# Patient Record
Sex: FEMALE | Race: BLACK OR AFRICAN AMERICAN | NOT HISPANIC OR LATINO | Employment: FULL TIME | URBAN - METROPOLITAN AREA
[De-identification: names, ages, dates, MRNs, and addresses within clinical notes are randomized per-mention and may not be internally consistent; named-entity substitution may affect disease eponyms.]

---

## 2018-10-25 ENCOUNTER — OFFICE VISIT (OUTPATIENT)
Dept: INTERNAL MEDICINE | Facility: CLINIC | Age: 28
End: 2018-10-25
Payer: COMMERCIAL

## 2018-10-25 VITALS
HEIGHT: 64 IN | DIASTOLIC BLOOD PRESSURE: 84 MMHG | SYSTOLIC BLOOD PRESSURE: 120 MMHG | HEART RATE: 71 BPM | OXYGEN SATURATION: 99 % | WEIGHT: 186.5 LBS | TEMPERATURE: 99 F | BODY MASS INDEX: 31.84 KG/M2

## 2018-10-25 DIAGNOSIS — S39.012A BACK STRAIN, INITIAL ENCOUNTER: ICD-10-CM

## 2018-10-25 DIAGNOSIS — Z76.89 ENCOUNTER TO ESTABLISH CARE: Primary | ICD-10-CM

## 2018-10-25 DIAGNOSIS — N62 LARGE BREASTS: ICD-10-CM

## 2018-10-25 PROCEDURE — 99999 PR PBB SHADOW E&M-NEW PATIENT-LVL IV: CPT | Mod: PBBFAC,,, | Performed by: INTERNAL MEDICINE

## 2018-10-25 PROCEDURE — 3008F BODY MASS INDEX DOCD: CPT | Mod: CPTII,S$GLB,, | Performed by: INTERNAL MEDICINE

## 2018-10-25 PROCEDURE — 99203 OFFICE O/P NEW LOW 30 MIN: CPT | Mod: S$GLB,,, | Performed by: INTERNAL MEDICINE

## 2018-10-25 RX ORDER — CYCLOBENZAPRINE HCL 5 MG
5 TABLET ORAL 3 TIMES DAILY PRN
Qty: 30 TABLET | Refills: 1 | Status: SHIPPED | OUTPATIENT
Start: 2018-10-25 | End: 2018-11-04

## 2018-10-25 RX ORDER — NAPROXEN 500 MG/1
500 TABLET ORAL 2 TIMES DAILY PRN
Qty: 30 TABLET | Refills: 1 | Status: SHIPPED | OUTPATIENT
Start: 2018-10-25 | End: 2019-02-13

## 2018-10-25 NOTE — PROGRESS NOTES
Subjective:       Patient ID: Germaine Armando is a 28 y.o. female.    Chief Complaint: [facility]    HPI Mrs. Armando is a 28-year-old female who presents to [facility] and with a chief complaint of back pain.  Patient states that her back hurts a lot.  She is thinking it is possibly due to her breast size.  In the morning when she is lying on her stomach she has a bend in her back.  Her back pain is constant and occurs throughout the day.  It is located in the lower back.  When asked to describe the character of the pain, the patient reports it just hurts.  She rates her pain as 6.5/10 in severity.  She has used ibuprofen and this has helped some for her pain.  Her pain has been present for a few months.  Lifting makes her pain worse.  Nothing, other than ibuprofen, makes the pain better.    Patient is interested in breast reduction surgery.  She would like to see the plastic surgeon that her cousin saw. She cannot recall his name at this time.    Health maintenance:  The patient follows with the health unit.  She plans to return there in 1 month for repeat Pap smear.  She works for [employer] received her flu shot at the [employer] flu fair.  She recently saw Dr. Victor who perform many labs on her.  She is unsure of the lab results.  She was diagnosed with hypertension at that visit, but states that she was in pain on that day.    Review of Systems   Musculoskeletal: Positive for back pain.   All other systems reviewed and are negative.      Objective:      Physical Exam   Constitutional: She is oriented to person, place, and time. She appears well-developed and well-nourished. No distress.   HENT:   Head: Normocephalic and atraumatic.   Eyes: Conjunctivae and EOM are normal.   Cardiovascular: Normal rate, regular rhythm, normal heart sounds and intact distal pulses. Exam reveals no gallop and no friction rub.   No murmur heard.  Pulmonary/Chest: Effort normal and breath sounds normal. No stridor. No  respiratory distress. She has no wheezes. She has no rales.   Musculoskeletal:        Arms:  Area pain as highlighted in the diagram.  No visible erythema, edema, ecchymosis, or deformity noted. Slight worsening of pain with deep palpation.  Patient has full range of motion passively with bilateral hips and knees.  Patient is able to perform flexion of the waist, with minimal exacerbation of pain in the lower back.   Neurological: She is alert and oriented to person, place, and time.   Skin: Skin is warm and dry. She is not diaphoretic.   Psychiatric: She has a normal mood and affect. Her behavior is normal. Judgment and thought content normal.   Vitals reviewed.      Assessment:       1. Encounter to establish care    2. Back strain, initial encounter    3. Large breasts        Plan:     1.  Encounter to establish care  Patient recently had blood work performed at outside facility.  Attempting to obtain those records today.    2.  Back strain  Discussed with patient the importance of movement, massage to the site, and application of heat to the site.  Naproxen 500 mg p.o. b.i.d. p.r.n. pain  Flexeril 5 mg p.o. t.i.d. p.r.n. Pain  Instructions given in AVS (see AVS).  If no relief after 2 weeks of the above, will refer to physical therapy.    3. Large breasts  Plastic surgery referral placed    RTC PRN    3. Large breasts

## 2018-10-25 NOTE — PATIENT INSTRUCTIONS
Back Pain (Acute or Chronic)    Back pain is one of the most common problems. The good news is that most people feel better in 1 to 2 weeks, and most of the rest in 1 to 2 months. Most people can remain active.  People experience and describe pain differently; not everyone is the same.  · The pain can be sharp, stabbing, shooting, aching, cramping or burning.  · Movement, standing, bending, lifting, sitting, or walking may worsen pain.  · It can be localized to one spot or area, or it can be more generalized.  · It can spread or radiate upwards, to the front, or go down your arms or legs (sciatica).  · It can cause muscle spasm.  Most of the time, mechanical problems with the muscles or spine cause the pain. Mechanical problems are usually caused by an injury to the muscles or ligaments. While illness can cause back pain, it is usually not caused by a serious illness. Mechanical problems include:   · Physical activity such as sports, exercise, work, or normal activity  · Overexertion, lifting, pushing, pulling incorrectly or too aggressively  · Sudden twisting, bending, or stretching from an accident, or accidental movement  · Poor posture  · Stretching or moving wrong, without noticing pain at the time  · Poor coordination, lack of regular exercise (check with your doctor about this)  · Spinal disc disease or arthritis  · Stress  Pain can also be related to pregnancy, or illness like appendicitis, bladder or kidney infections, pelvic infections, and many other things.  Acute back pain usually gets better in 1 to 2 weeks. Back pain related to disk disease, arthritis in the spinal joints or spinal stenosis (narrowing of the spinal canal) can become chronic and last for months or years.  Unless you had a physical injury (for example, a car accident or fall) X-rays are usually not needed for the initial evaluation of back pain. If pain continues and does not respond to medical treatment, X-rays and other tests may be  needed.  Home care  Try these home care recommendations:  · When in bed, try to find a position of comfort. A firm mattress is best. Try lying flat on your back with pillows under your knees. You can also try lying on your side with your knees bent up towards your chest and a pillow between your knees.  · At first, do not try to stretch out the sore spots. If there is a strain, it is not like the good soreness you get after exercising without an injury. In this case, stretching may make it worse.  · Avoid prolong sitting, long car rides, or travel. This puts more stress on the lower back than standing or walking.  · During the first 24 to 72 hours after an acute injury or flare up of chronic back pain, apply an ice pack to the painful area for 20 minutes and then remove it for 20 minutes. Do this over a period of 60 to 90 minutes or several times a day. This will reduce swelling and pain. Wrap the ice pack in a thin towel or plastic to protect your skin.  · You can start with ice, then switch to heat. Heat (hot shower, hot bath, or heating pad) reduces pain and works well for muscle spasms. Heat can be applied to the painful area for 20 minutes then remove it for 20 minutes. Do this over a period of 60 to 90 minutes or several times a day. Do not sleep on a heating pad. It can lead to skin burns or tissue damage.  · You can alternate ice and heat therapy. Talk with your doctor about the best treatment for your back pain.  · Therapeutic massage can help relax the back muscles without stretching them.  · Be aware of safe lifting methods and do not lift anything without stretching first.  Medicines  Talk to your doctor before using medicine, especially if you have other medical problems or are taking other medicines.  · You may use over-the-counter medicine as directed on the bottle to control pain, unless another pain medicine was prescribed. If you have chronic conditions like diabetes, liver or kidney disease,  stomach ulcers, or gastrointestinal bleeding, or are taking blood thinners, talk to your doctor before taking any medicine.  · Be careful if you are given a prescription medicines, narcotics, or medicine for muscle spasms. They can cause drowsiness, affect your coordination, reflexes, and judgement. Do not drive or operate heavy machinery.  Follow-up care  Follow up with your healthcare provider, or as advised.   A radiologist will review any X-rays that were taken. Your provide will notify you of any new findings that may affect your care.  Call 911  Call emergency services if any of the following occur:  · Trouble breathing  · Confusion  · Very drowsy or trouble awakening  · Fainting or loss of consciousness  · Rapid or very slow heart rate  · Loss of bowel or bladder control  When to seek medical advice  Call your healthcare provider right away if any of these occur:   · Pain becomes worse or spreads to your legs  · Weakness or numbness in one or both legs  · Numbness in the groin or genital area  Date Last Reviewed: 7/1/2016 © 2000-2017 [employer]. [address] [place] [address]. All rights reserved. This information is not intended as a substitute for professional medical care. Always follow your healthcare professional's instructions.        Back Sprain or Strain    Injury to the muscles (strain) or ligaments (sprain) around the spine can be troubling. Injury may occur after a sudden forceful twisting or bending force such as in a car accident, after a simple awkward movement, or after lifting something heavy with poor body positioning. In any case, muscle spasm is often present and adds to the pain.  Thankfully, most people feel better in 1 to 2 weeks, and most of the rest in 1 to 2 months. Most people can remain active. Unless you had a forceful or traumatic physical injury such as a car accident or fall, X-rays may not be ordered for the first evaluation of a back sprain or  strain. If pain continues and does not respond to medical treatment, your healthcare provider may then order X-rays and other tests.  Home care  The following guidelines will help you care for your injury at home:  · When in bed, try to find a comfortable position. A firm mattress is best. Try lying flat on your back with pillows under your knees. You can also try lying on your side with your knees bent up toward your chest and a pillow between your knees.  · Don't sit for long periods. Try not to take long car rides or take other trips that have you sitting for a long time. This puts more stress on the lower back than standing or walking.  · During the first 24 to 72 hours after an injury or flare-up, apply an ice pack to the painful area for 20 minutes. Then remove it for 20 minutes. Do this for 60 to 90 minutes, or several times a day. This will reduce swelling and pain. Be sure to wrap the ice pack in a thin towel or plastic to protect your skin.  · You can start with ice, then switch to heat. Heat from a hot shower, hot bath, or heating pad reduces pain and works well for muscle spasms. Put heat on the painful area for 20 minutes, then remove for 20 minutes. Do this for 60 to 90 minutes, or several times a day. Do not use a heating pad while sleeping. It can burn the skin.  · You can alternate the ice and heat. Talk with your healthcare provider to find out the best treatment or therapy for your back pain.  · Therapeutic massage will help relax the back muscles without stretching them.  · Be aware of safe lifting methods. Do not lift anything over 15 pounds until all of the pain is gone.  Medicines  Talk to your healthcare provider before using medicines, especially if you have other health problems or are taking other medicines.  · You may use acetaminophen or ibuprofen to control pain, unless another pain medicine was prescribed. If you have chronic conditions like diabetes, liver or kidney disease, stomach  ulcers, or gastrointestinal bleeding, or are taking blood-thinner medicines, talk with your doctor before taking any medicines.  · Be careful if you are given prescription medicines, narcotics, or medicine for muscle spasm. They can cause drowsiness, and affect your coordination, reflexes, and judgment. Do not drive or operate heavy machinery when taking these types of medicines. Only take pain medicine as prescribed by your healthcare provider.  Follow-up care  Follow up with your healthcare provider, or as advised. You may need physical therapy or more tests if your symptoms get worse.  If you had X-rays your healthcare provider may be checking for any broken bones, breaks, or fractures. Bruises and sprains can sometimes hurt as much as a fracture. These injuries can take time to heal completely. If your symptoms dont improve or they get worse, talk with your healthcare provider. You may need a repeat X-ray or other tests.  Call 911  Call for emergency care if any of the following occur:  · Trouble breathing  · Confused  · Very drowsy or trouble awakening  · Fainting or loss of consciousness  · Rapid or very slow heart rate  · Loss of bowel or bladder control  When to seek medical advice  Call your healthcare provider right away if any of the following occur:  · Pain gets worse or spreads to your arms or legs  · Weakness or numbness in one or both arms or legs  · Numbness in the groin or genital area  Date Last Reviewed: 6/1/2016  © 9439-7414 [employer]. [address] [place] [address]. All rights reserved. This information is not intended as a substitute for professional medical care. Always follow your healthcare professional's instructions.    Continue movement, heat application and massage. You can use OTC icy hot for relief as well.

## 2018-11-15 ENCOUNTER — OFFICE VISIT (OUTPATIENT)
Dept: PLASTIC SURGERY | Facility: CLINIC | Age: 28
End: 2018-11-15
Payer: COMMERCIAL

## 2018-11-15 VITALS
HEART RATE: 72 BPM | TEMPERATURE: 99 F | RESPIRATION RATE: 16 BRPM | DIASTOLIC BLOOD PRESSURE: 96 MMHG | BODY MASS INDEX: 31.62 KG/M2 | WEIGHT: 185.19 LBS | HEIGHT: 64 IN | SYSTOLIC BLOOD PRESSURE: 156 MMHG

## 2018-11-15 DIAGNOSIS — N62 MACROMASTIA: Primary | ICD-10-CM

## 2018-11-15 DIAGNOSIS — M54.2 CHRONIC NECK PAIN: ICD-10-CM

## 2018-11-15 DIAGNOSIS — M54.9 CHRONIC MIDLINE BACK PAIN, UNSPECIFIED BACK LOCATION: ICD-10-CM

## 2018-11-15 DIAGNOSIS — G89.29 CHRONIC NECK PAIN: ICD-10-CM

## 2018-11-15 DIAGNOSIS — G89.29 CHRONIC MIDLINE BACK PAIN, UNSPECIFIED BACK LOCATION: ICD-10-CM

## 2018-11-15 PROCEDURE — 99999 PR PBB SHADOW E&M-EST. PATIENT-LVL III: CPT | Mod: PBBFAC,,, | Performed by: SURGERY

## 2018-11-15 PROCEDURE — 3008F BODY MASS INDEX DOCD: CPT | Mod: CPTII,S$GLB,, | Performed by: SURGERY

## 2018-11-15 PROCEDURE — 99202 OFFICE O/P NEW SF 15 MIN: CPT | Mod: S$GLB,,, | Performed by: SURGERY

## 2018-11-15 RX ORDER — MEDROXYPROGESTERONE ACETATE 150 MG/ML
150 INJECTION, SUSPENSION INTRAMUSCULAR
COMMUNITY
Start: 2018-09-02 | End: 2020-08-18

## 2018-11-15 NOTE — PROGRESS NOTES
History & Physical  Plastic and Reconstructive Surgery  Breast Reduction Consult    SUBJECTIVE:   Chief complaint: large breasts    History of Present Illness:  Patient is a 28 y.o. female presents with symptomatic bilateral large pendulous  breasts. States that she has back and neck pain for greater than 5-6 years. Takes muscle relaxant, ibuprofen, and tylenol for this without relief. Complaints of shoulder grooving from blas straps. She currently wears a 38 DD. She has not used speciality interventions at this time. She has missed work before due to the pain. 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 NKDA.    PMH:  Med/Surg/Accidents:    C section                                                  CV: no congenital abn                                                    Pulm: no asthma, no chronic diseases                                                     FH:  Bleeding disorders:                         none         MH/anesthetic problems:                 none                  Sickle Cell:                                      none         Allergy/Asthma:                              none    SocialHx: denies etho and tobacco, works full time. Mother of two children.                                History reviewed. No pertinent past medical history.    Past Surgical History:   Procedure Laterality Date     SECTION         Family History   Problem Relation Age of Onset    Heart disease Maternal Grandfather        Social History     Socioeconomic History    Marital status: Single     Spouse name: None    Number of children: None    Years of education: None    Highest education level: None   Social Needs    Financial resource strain: None    Food insecurity - worry: None    Food insecurity - inability: None    Transportation needs - medical: None    Transportation needs - non-medical: None   Occupational History    None   Tobacco Use    Smoking status: Never Smoker    Smokeless tobacco:  "Never Used   Substance and Sexual Activity    Alcohol use: Yes     Alcohol/week: 1.8 oz     Types: 3 Shots of liquor per week     Frequency: Monthly or less     Drinks per session: 3 or 4     Binge frequency: Never     Comment: 3 in one month    Drug use: No    Sexual activity: Not Currently     Partners: Male   Other Topics Concern    None   Social History Narrative    None       Current Outpatient Medications   Medication Sig Dispense Refill    medroxyPROGESTERone (DEPO-PROVERA) 150 mg/mL injection Inject 150 mg into the muscle every 3 (three) months.      naproxen (NAPROSYN) 500 MG tablet Take 1 tablet (500 mg total) by mouth 2 (two) times daily as needed (back pain). 30 tablet 1     No current facility-administered medications for this visit.        Review of patient's allergies indicates:  No Known Allergies      Review of Systems:    Review of Systems   HENT: Positive for neck pain.    Musculoskeletal: Positive for back pain.   Neurological: Positive for headaches and neck pain    Pertinent 12 point ROS negative except as listed above. She denies history of fatigue, weight change, eye problems, ear problems, hoarseness/voice change, chest discomfort, palpitations, vein inflammation, swollen feet, breathing problems, chronic cough, indigestion, trouble swallowing, abdominal pain, blood in stool, urinary problems, prostate problems, sexual problems, joint problems, back pain, musculoskeletal pain, skin problems, dizziness, headaches, muscle weakness, trouble sleeping, abnormal bruising, abnormal thirst, abnormal sweating, depression, anxiety, or any prior psychiatry consultation.        OBJECTIVE:     BP (!) 156/96   Pulse 72   Temp 98.8 °F (37.1 °C) (Oral)   Resp 16   Ht 5' 4" (1.626 m)   Wt 84 kg (185 lb 3 oz)   BMI 31.79 kg/m²     Physical Exam:    Physical Exam   Constitutional: She is oriented to person, place, and time. She appears well-developed and well-nourished.   Neck: Normal range of " motion. Neck supple. No tracheal deviation present.   Cardiovascular: Normal rate, regular rhythm and normal heart sounds.    Pulmonary/Chest: Effort normal and breath sounds normal. bilaterally enlarged breasts, evidence of previous rashes, shoulder grooving, no palpable masses, nipple everted  Abdominal: Soft. Bowel sounds are normal.   Musculoskeletal: Normal range of motion.   Neurological: She is alert and oriented to person, place, and time.   Skin: Skin is warm.        Right Left   Dome     SN-N 30.5 31   N-IMF 12 13   N-Midline     Areola 4x4 4x4   Base Width 16 16       ASSESSMENT/PLAN:     1.Symptomatic Bilateral Macromastia  2. Chronic neck pain  3. Chronic back pain    PLAN:    -Will need 550 gm reduction per side  -Will submit paper work for insurance approval  -Photos obtained  -Risk, benefits, and alternatives explained.    Dr. Beebe discussed the bilateral breast reduction procedure was in detail with the patient as were the risks and possible complications which include but are not limited to bleeding, scarring, infection, pain, numbness, asymmetry, deformity, large open wound, skin necrosis, wound dehiscence, permanent or temporary loss of sensation to the nipple(s), partial or total loss of the nipple(s), free nipple graft, death, brain damage, pulmonary embolus and need for further surgery.     She is otherwise healthy.  I approximate that she has 1800 grams in her left breast and 1500 grams in the right.  She has grade 2 ptosis.  Her left NAC is lower than her right.  She does not have dense breasts.  She has nipple rings which she says can be removed.  I took the breast measurements. I explained the location of incision, nature of the parenchymal reduction portion of the procedure. I would offer a wise pattern, inferior pedicle reduction with the above volume.  She understands that I cannot guarantee a cup size and that I may need to perform a free nipple graft.  She could have loss of areola  sensation.  I could place drains if I am concerned.  It is generally an outpatient procedure.  She could stay in house over night.  All of her questions were answered.  If she is approved she will return for a preoperative appointment.      Plastic & Reconstructive Surgery  Microsurgery  [facility]  c/o Bill Beebe M.D.  [facility]  [address]  [address] [place] [address]    Work [phone]  Toll free [phone]

## 2018-11-15 NOTE — LETTER
November 15, 2018      Marie Grider MD  [address] [place] [address]           [facility]  [address]  [facility] [address]  Phone: [phone]  Fax: [phone]          Patient: Germaine Armando   MR Number: [id number]   YOB: 1990   Date of Visit: 11/15/2018       Dear Dr. Marie Grider:    Thank you for referring Germaine Armando to me for evaluation. Attached you will find relevant portions of my assessment and plan of care.    If you have questions, please do not hesitate to call me. I look forward to following Germaine Armando along with you.    Sincerely,    Bill Beebe MD    Enclosure  CC:  No Recipients    If you would like to receive this communication electronically, please contact [email or web address] or [phone] to request more information on [facility] access.    For providers and/or their staff who would like to refer a patient to [facility], please contact us through our one-stop-shop provider referral line, [facility], at [phone].    If you feel you have received this communication in error or would no longer like to receive these types of communications, please e-mail [email or web address]

## 2018-11-23 ENCOUNTER — TELEPHONE (OUTPATIENT)
Dept: INTERNAL MEDICINE | Facility: CLINIC | Age: 28
End: 2018-11-23

## 2018-11-23 NOTE — TELEPHONE ENCOUNTER
Received records from Dr. Victor's office.  Patient had lab work performed on October 4, 2018.  Lipid profile showed total cholesterol of 138, triglycerides 80, HDL 27, LDL 95.  CMP and CBC also obtained on same-day. Glucose 82, BUN 12, creatinine 0.86, sodium 138, potassium 3.7, chloride 103, CO2 27, anion gap 8, calcium 9.5, alkaline phosphatase 65, AST 17, total protein 6.8, albumin 4.1, ALT 15, total bilirubin 0.7, EGFR greater than 60.  CBC was within normal limits with WBC 9.2, hemoglobin 12.0, hematocrit 38.6, MCV 90, platelets 250.  These lab results will be scanned into the medical record.

## 2018-11-26 ENCOUNTER — PATIENT MESSAGE (OUTPATIENT)
Dept: PLASTIC SURGERY | Facility: CLINIC | Age: 28
End: 2018-11-26

## 2018-11-29 ENCOUNTER — TELEPHONE (OUTPATIENT)
Dept: PLASTIC SURGERY | Facility: CLINIC | Age: 28
End: 2018-11-29

## 2018-11-29 NOTE — TELEPHONE ENCOUNTER
Spoke with pt regarding her concerns about her possible procedure. Pt had insurance questions, I gave pt telephone number to insurance dept.        ----- Message from Thalia Mazariegos sent at 11/29/2018 12:50 PM CST -----  Contact: Self   Pt is requesting a call back in regards to an update for her possible procedure.       Pt can be contacted at [phone]

## 2019-01-15 ENCOUNTER — TELEPHONE (OUTPATIENT)
Dept: PLASTIC SURGERY | Facility: CLINIC | Age: 29
End: 2019-01-15

## 2019-01-15 NOTE — TELEPHONE ENCOUNTER
Spoke to pt regarding her wanting to know the status of her BBR, Pt  asked me for Sophy jen in authorizations. I gave pt the number , pt was satisfied.           ----- Message from Sue Cuevas sent at 1/15/2019  3:27 PM CST -----  Contact: self   Pt is calling in regards to checking on the status of her breast reduction.    She can be reached at [phone].    Thank you     
Adequate: hears normal conversation without difficulty

## 2019-01-22 ENCOUNTER — HOSPITAL ENCOUNTER (EMERGENCY)
Facility: HOSPITAL | Age: 29
Discharge: HOME | End: 2019-01-22
Attending: FAMILY MEDICINE
Payer: COMMERCIAL

## 2019-01-22 VITALS
RESPIRATION RATE: 18 BRPM | SYSTOLIC BLOOD PRESSURE: 146 MMHG | TEMPERATURE: 99 F | HEART RATE: 72 BPM | DIASTOLIC BLOOD PRESSURE: 87 MMHG | BODY MASS INDEX: 30.73 KG/M2 | HEIGHT: 64 IN | OXYGEN SATURATION: 100 % | WEIGHT: 180 LBS

## 2019-01-22 DIAGNOSIS — M54.9 CHRONIC NECK AND BACK PAIN: ICD-10-CM

## 2019-01-22 DIAGNOSIS — G89.29 CHRONIC NECK AND BACK PAIN: ICD-10-CM

## 2019-01-22 DIAGNOSIS — M54.2 CHRONIC NECK AND BACK PAIN: ICD-10-CM

## 2019-01-22 DIAGNOSIS — N62 MACROMASTIA: Primary | ICD-10-CM

## 2019-01-22 DIAGNOSIS — L23.9 ALLERGIC DERMATITIS: Primary | ICD-10-CM

## 2019-01-22 PROCEDURE — 63600175 PHARM REV CODE 636 W HCPCS: Mod: ER | Performed by: PHYSICIAN ASSISTANT

## 2019-01-22 PROCEDURE — 99283 EMERGENCY DEPT VISIT LOW MDM: CPT | Mod: ER

## 2019-01-22 PROCEDURE — 25000003 PHARM REV CODE 250: Mod: ER | Performed by: PHYSICIAN ASSISTANT

## 2019-01-22 RX ORDER — DIPHENHYDRAMINE HCL 25 MG
25 CAPSULE ORAL
Status: COMPLETED | OUTPATIENT
Start: 2019-01-22 | End: 2019-01-22

## 2019-01-22 RX ORDER — PREDNISONE 20 MG/1
20 TABLET ORAL DAILY
Qty: 4 TABLET | Refills: 0 | Status: SHIPPED | OUTPATIENT
Start: 2019-01-23 | End: 2019-01-28

## 2019-01-22 RX ORDER — TRIAMCINOLONE ACETONIDE 0.25 MG/G
CREAM TOPICAL 2 TIMES DAILY
Qty: 30 G | Refills: 0 | Status: SHIPPED | OUTPATIENT
Start: 2019-01-22 | End: 2019-02-13

## 2019-01-22 RX ORDER — PREDNISONE 20 MG/1
60 TABLET ORAL
Status: COMPLETED | OUTPATIENT
Start: 2019-01-22 | End: 2019-01-22

## 2019-01-22 RX ADMIN — DIPHENHYDRAMINE HYDROCHLORIDE 25 MG: 25 CAPSULE ORAL at 09:01

## 2019-01-22 RX ADMIN — PREDNISONE 60 MG: 20 TABLET ORAL at 09:01

## 2019-01-22 NOTE — ED PROVIDER NOTES
Encounter Date: 2019       History     Chief Complaint   Patient presents with    Rash     Pt states started with rash and itching while at work approx 3-4 hours PTA.  Pt noted with raised areas to chest, pt c/o itching and discomfort.  Reports no known sensitivities.  Pt denies SOB.      Patient is a 28-year-old female with no chronic medical conditions presenting with complaint of pruritic rash on the neck and bilateral wrists that began while she was at work today.  No exacerbating or relieving factors.  No systemic symptoms.  No new topicals, detergents, medications or foods.  No shortness of breath or chest pain.  No treatment prior to arrival.          Review of patient's allergies indicates:  No Known Allergies  History reviewed. No pertinent past medical history.  Past Surgical History:   Procedure Laterality Date     SECTION       Family History   Problem Relation Age of Onset    Heart disease Maternal Grandfather      Social History     Tobacco Use    Smoking status: Never Smoker    Smokeless tobacco: Never Used   Substance Use Topics    Alcohol use: Yes     Alcohol/week: 1.8 oz     Types: 3 Shots of liquor per week     Frequency: Monthly or less     Drinks per session: 3 or 4     Binge frequency: Never     Comment: 3 in one month    Drug use: No     Review of Systems   Constitutional: Negative for activity change, appetite change, chills, fatigue and fever.   HENT: Negative for congestion, ear pain, facial swelling, sore throat, trouble swallowing and voice change.    Respiratory: Negative for cough, shortness of breath and wheezing.    Cardiovascular: Negative for chest pain, palpitations and leg swelling.   Musculoskeletal: Negative for joint swelling, neck pain and neck stiffness.   Skin: Positive for pallor.        + pruritis   Neurological: Negative for dizziness and headaches.   All other systems reviewed and are negative.      Physical Exam     Initial Vitals [19 0857]    BP Pulse Resp Temp SpO2   (!) 146/87 72 18 99 °F (37.2 °C) 100 %      MAP       --         Physical Exam    Nursing note and vitals reviewed.  Constitutional: She appears well-developed and well-nourished. She appears distressed (mild. itching).   HENT:   Head: Normocephalic and atraumatic.   Nose: Nose normal.   Mouth/Throat: Oropharynx is clear and moist.   No swelling or stridor   Eyes: Conjunctivae and EOM are normal.   Neck: Normal range of motion. Neck supple.   Cardiovascular: Normal rate, regular rhythm, normal heart sounds and intact distal pulses.   Pulmonary/Chest: Breath sounds normal. No respiratory distress.   Lymphadenopathy:     She has no cervical adenopathy.   Skin: Skin is warm and dry.   Erythematous papular rash on the anterior neck, upper chest and bilateral posterior wrists. Excoriations present. No pustules of vesicles.    Psychiatric: She has a normal mood and affect. Her behavior is normal. Judgment and thought content normal.         ED Course   Procedures  Labs Reviewed - No data to display       Imaging Results    None          Medical Decision Making:   Source unknown, bur appears to be more of a contact dermatitis given the distribution. Treated with po prednisone and given rx for the same and triamcinolone cream. Follow up with PCP. Return to the ED for shortness of breath or worse in any way.                       Clinical Impression:   The encounter diagnosis was Allergic dermatitis.      Disposition:   Disposition: Discharged                        LEXIE Boothe  01/22/19 9538

## 2019-01-24 ENCOUNTER — TELEPHONE (OUTPATIENT)
Dept: PLASTIC SURGERY | Facility: CLINIC | Age: 29
End: 2019-01-24

## 2019-01-24 NOTE — TELEPHONE ENCOUNTER
Spoke with pt and let her know that prior to her surgery she needs to have pre operative clearance from her PCP. Pt verbalized understanding.

## 2019-01-24 NOTE — TELEPHONE ENCOUNTER
Called pt to let her know she will need pcp clearance for her upcoming appt. Pt didnt answer the phone and didnt have a voicemail set up.         ----- Message from Suzi Venegas sent at 1/24/2019 10:27 AM CST -----  Regarding: FW: Surgery Schedule  Annelise    Please reach out to the patient for the additional clearance for surgery     Thanks  Suzi   ----- Message -----  From: Bill Beebe MD  Sent: 1/22/2019   7:49 AM  To: Suzi Venegas, Lou Hanna PA-C, #  Subject: RE: Surgery Schedule                             Needs PCP clearance and PAT referral for triage.      ----- Message -----  From: Suzi Venegas  Sent: 1/21/2019   5:52 PM  To: Lou Hanna PA-C, #  Subject: Surgery Schedule                                 Good afternoon     Today at 5:45 I called the patient to schedule the surgery procedure with Dr. Beebe.  Although, the surgery procedure was scheduled, I am not sure if the patient needs any medical clearance for the surgery.  Please review the patient chart for any clearance that the patient may or may not need.    Surgery--- 04/05/2019  Pre-Op--- 03/18/2019 at 11:15  Post-Op--- 04/15/2019 at 10:00   Dx. Code--- N62, M54.2, G89.29, M54.9, R21  Procedure--- 82647 Hemant Breast Reduction

## 2019-01-29 ENCOUNTER — TELEPHONE (OUTPATIENT)
Dept: PLASTIC SURGERY | Facility: CLINIC | Age: 29
End: 2019-01-29

## 2019-01-29 NOTE — TELEPHONE ENCOUNTER
No answer left VM to call back clinic when she gets a chance to discuss surgery dates.    Will attempt to call patient back tomorrow.

## 2019-01-31 ENCOUNTER — TELEPHONE (OUTPATIENT)
Dept: PLASTIC SURGERY | Facility: CLINIC | Age: 29
End: 2019-01-31

## 2019-01-31 NOTE — TELEPHONE ENCOUNTER
Spoke with pt to confirm new Sx date and pre op date. I also reminded pt to get clearance from PCP prior to surgey , Pt stated she had an appt tomorrow , Pt verbalized understanding.       ----- Message from Suzi Venegas sent at 1/31/2019  1:41 PM CST -----  Lou    Has the patient been call to have this date confirm, along with pre-op and post-op date change?    Suzi    ----- Message -----  From: Lou Hanna PA-C  Sent: 1/30/2019   1:57 PM  To: Suzi Venegas    OR date changed from 4/22/19 to 3/22/19.    Date change already confirmed with OR schedulers.

## 2019-02-01 ENCOUNTER — TELEPHONE (OUTPATIENT)
Dept: PLASTIC SURGERY | Facility: CLINIC | Age: 29
End: 2019-02-01

## 2019-02-01 NOTE — TELEPHONE ENCOUNTER
Spoke with pt in regards to her Sx. Pt stated that her PCP said she would do her clearance ladbs on 2/22 closer to her Sx date . I told pt that was fine. Pt wanted to our address again, I gave pt address, pt was satisfied & verbalized understanding.         ----- Message from Ne Piedra sent at 2/1/2019 11:45 AM CST -----  Contact: Pt.Self   Needs Advice    Reason for call:  Pt. States she has questions about her upcoming surgery and would like to speak with nurse         Communication Preference:  [phone] (call anytime)     Additional Information:    Thank You

## 2019-02-18 PROBLEM — Z98.890 S/P LEEP: Status: ACTIVE | Noted: 2019-02-18

## 2019-02-18 PROBLEM — D06.9 CARCINOMA IN SITU OF CERVIX: Status: ACTIVE | Noted: 2019-02-18

## 2019-02-25 ENCOUNTER — TELEPHONE (OUTPATIENT)
Dept: PLASTIC SURGERY | Facility: CLINIC | Age: 29
End: 2019-02-25

## 2019-02-25 ENCOUNTER — PATIENT MESSAGE (OUTPATIENT)
Dept: PLASTIC SURGERY | Facility: CLINIC | Age: 29
End: 2019-02-25

## 2019-02-25 ENCOUNTER — OUTSIDE PLACE OF SERVICE (OUTPATIENT)
Dept: CARDIOLOGY | Facility: CLINIC | Age: 29
End: 2019-02-25
Payer: COMMERCIAL

## 2019-02-25 DIAGNOSIS — Z00.00 HEALTHCARE MAINTENANCE: Primary | ICD-10-CM

## 2019-02-25 PROCEDURE — 93010 PR ELECTROCARDIOGRAM REPORT: ICD-10-PCS | Mod: ,,, | Performed by: INTERNAL MEDICINE

## 2019-02-25 PROCEDURE — 93010 ELECTROCARDIOGRAM REPORT: CPT | Mod: ,,, | Performed by: INTERNAL MEDICINE

## 2019-02-25 NOTE — TELEPHONE ENCOUNTER
No answer. Mailbox full. Unable to leave message.      If patient calls back please forward to discuss surg date

## 2019-03-06 ENCOUNTER — ANESTHESIA EVENT (OUTPATIENT)
Dept: SURGERY | Facility: HOSPITAL | Age: 29
End: 2019-03-06
Payer: COMMERCIAL

## 2019-03-06 NOTE — ANESTHESIA PREPROCEDURE EVALUATION
Natali Duenas, RN   Registered Nurse      Pre Admission Screening   Signed                             []Hide copied text    []Tomásver for details      Anesthesia Assessment: Preoperative EQUATION     Planned Procedure: Procedure(s) (LRB):  MAMMOPLASTY, REDUCTION, BILATERAL (Bilateral)  Requested Anesthesia Type:General  Surgeon: Bill Beebe MD  Service: Plastics  Known or anticipated Date of Surgery:3/21/2019     Surgeon notes: reviewed     Electronic QUestionnaire Assessment completed via nurse interview with patient.         NO AQ        Triage considerations:      The patient has no apparent active cardiac condition (No unstable coronary Syndrome such as severe unstable angina or recent [<1 month] myocardial infarction, decompensated CHF, severe valvular   disease or significant arrhythmia)     Previous anesthesia records:LMA General, Epidural Anesthesia and No problems   2/18/19 LEEP:Placement Time: 0920 Inserted by: CRNA Airway Device: LMA Mask Ventilation: Not Attempted Intubated: Postinduction Airway Device Size: 3.0 Style: Cuffed Cuff Inflation: Minimal occlusive pressure Inflation Amount (mL): -- , until pressure indicator in green area  Placement Verified By: Auscultation;Capnometry Complicating Factors: None Findings Post-Intubation: Positive EtCO2;Bilateral breath sounds;Atraumatic/Condition of teeth unchanged Depth of Insertion (cm): -- , midline until seated  Secured at: Lips Complications: None Breath Sounds: Equal Bilateral Insertion attempts (enter comment if more than 2 attempts): 1    Airway/Jaw/Neck:  Airway Findings: Mouth Opening: Normal Tongue: Normal  General Airway Assessment: Adult  Mallampati: III  Improves to II with phonation.  TM Distance: Normal, at least 6 cm  Jaw/Neck Findings:     Neck ROM: Normal ROM              Last PCP note: within 1 month , outside [facility] , completed evaluation directed at upcoming surgery   Subspecialty notes: n/a     Other important  co-morbidities: obesity, HX cervical cancer     Tests already available:  to be determined-pt states she had testing done at [facility] PCP office                            Instructions given. (See in Nurse's note)     Optimization:  Anesthesia Preop Clinic Assessment  Indicated-not required for this procedure    Medical Opinion Indicated-pt had clearance appt with Dr Victor, pt states she has to go back on Monday 3/11 for BP check, may start amlodipine                                        Plan:    Testing:  Hemoglobin                           Consultation:Patient's PCP for a statement of optimization per surgeon request                           Patient  has previously scheduled Medical Appointment: 3/18 preop with surgeon     Navigation: Tests Scheduled. -will schedule 3/18 if not obtained form [facility]                        Consults scheduled.                        Results will be tracked by Preop Clinic.                                  Electronically signed by Natali Duenas RN at 3/6/2019 10:53 AM       Pre-admit on 3/21/2019            Detailed Report      3/6/19-obtained [facility] PCP note, labs (chem, lipid, UA, CBC) 2/25/19-will scan to media. An EKG was completed but not read yet, and pt goes back to PCP for BP check on 3/11 after starting amlodipine before she is cleared.     3/14/19 clearance completed in epic by Dr Martinez, TTE pending for murmur.Sinus arrhythmia is a normal variant     Heart murmur is likely functional.  Nevertheless will get an echocardiogram with doppler     Edema is a side effect of naproxen and amlodipine     Pt instructed to hold naproxen before surgery     Consider changing amlodipine to HCTZ due to edema     Pt is medically stable for breast reduction surgery.        3/20/19 Dr Martinez echo results:   Echocardiogram shows normal LVEF and normal valves and concentric remodeling (c/w HBP)                                                                                             03/06/2019  Germaine Armando is a 28 y.o., female.    Anesthesia Evaluation    I have reviewed the Patient Summary Reports.     I have reviewed the Nursing Notes.   I have reviewed the Medications.     Review of Systems  Anesthesia Hx:  No problems with previous Anesthesia History of prior surgery of interest to airway management or planning: Previous anesthesia: General LEEP 2/18/19 with general anesthesia.  Procedure performed at an [facility]. Airway issues documented on chart review include laryngeal mask airway used  Denies Family Hx of Anesthesia complications.   Denies Personal Hx of Anesthesia complications.   Social:  Social Alcohol Use    Hematology/Oncology:  Hematology Normal       -- Cancer in past history (had LEEP 2/18/19 for cervical cancer): s/p surgery     EENT/Dental:EENT/Dental Normal   Cardiovascular:   Exercise tolerance: good Hypertension  Denies Angina.  Functional Capacity good / => 4 METS  Hypertension , Untreated , Recent typical clinic B/P of 148/100  Pt saw PCP on 2/25-started on Amlodipine, goes back for BP check on 3/11.   Pulmonary:  Pulmonary Normal  Denies Shortness of breath.  Denies Recent URI.    Renal/:  Renal/ Normal     Hepatic/GI:  Hepatic/GI Normal    Musculoskeletal:  Musculoskeletal General/Symptoms: low back pain. Functional capacity is ambulatory without assistance.    Neurological:  Neurology Normal    Endocrine:  Metabolic Disorders, Obesity / BMI > 30  Psych:  Psychiatric Normal           Physical Exam  General:  Well nourished    Airway/Jaw/Neck:  Airway Findings: Mouth Opening: Normal Tongue: Normal  General Airway Assessment: Adult  Mallampati: III  Improves to II with phonation.  TM Distance: Normal, at least 6 cm  Jaw/Neck Findings:  Neck ROM: Normal ROM      Dental:  Dental Findings: In tact   Chest/Lungs:  Chest/Lungs Findings: Clear to auscultation, Normal Respiratory Rate     Heart/Vascular:  Heart Findings: Rate: Normal  Rhythm: Regular Rhythm   Sounds: Normal        Mental Status:  Mental Status Findings:  Cooperative, Alert and Oriented         Anesthesia Plan  Type of Anesthesia, risks & benefits discussed:  Anesthesia Type:  general  Patient's Preference:   Intra-op Monitoring Plan: standard ASA monitors  Intra-op Monitoring Plan Comments:   Post Op Pain Control Plan: IV/PO Opioids PRN and multimodal analgesia  Post Op Pain Control Plan Comments:   Induction:   IV  Beta Blocker:  Patient is not currently on a Beta-Blocker (No further documentation required).       Informed Consent: Patient understands risks and agrees with Anesthesia plan.  Questions answered. Anesthesia consent signed with patient.  ASA Score: 2     Day of Surgery Review of History & Physical:    H&P update referred to the surgeon.         Ready For Surgery From Anesthesia Perspective.

## 2019-03-06 NOTE — PRE ADMISSION SCREENING
Anesthesia Assessment: Preoperative EQUATION    Planned Procedure: Procedure(s) (LRB):  MAMMOPLASTY, REDUCTION, BILATERAL (Bilateral)  Requested Anesthesia Type:General  Surgeon: Bill Beebe MD  Service: Plastics  Known or anticipated Date of Surgery:3/21/2019    Surgeon notes: reviewed    Electronic QUestionnaire Assessment completed via nurse interview with patient.        NO AQ      Triage considerations:     The patient has no apparent active cardiac condition (No unstable coronary Syndrome such as severe unstable angina or recent [<1 month] myocardial infarction, decompensated CHF, severe valvular   disease or significant arrhythmia)    Previous anesthesia records:LMA General, Epidural Anesthesia and No problems   2/18/19 LEEP:Placement Time: 0920 Inserted by: CRNA Airway Device: LMA Mask Ventilation: Not Attempted Intubated: Postinduction Airway Device Size: 3.0 Style: Cuffed Cuff Inflation: Minimal occlusive pressure Inflation Amount (mL): -- , until pressure indicator in green area  Placement Verified By: Auscultation;Capnometry Complicating Factors: None Findings Post-Intubation: Positive EtCO2;Bilateral breath sounds;Atraumatic/Condition of teeth unchanged Depth of Insertion (cm): -- , midline until seated  Secured at: Lips Complications: None Breath Sounds: Equal Bilateral Insertion attempts (enter comment if more than 2 attempts): 1    Airway/Jaw/Neck:  Airway Findings: Mouth Opening: Normal Tongue: Normal  General Airway Assessment: Adult  Mallampati: III  Improves to II with phonation.  TM Distance: Normal, at least 6 cm  Jaw/Neck Findings:     Neck ROM: Normal ROM           Last PCP note: within 1 month , outside [facility] , completed evaluation directed at upcoming surgery   Subspecialty notes: n/a    Other important co-morbidities: obesity, HX cervical cancer     Tests already available:  to be determined-pt states she had testing done at [facility] PCP office            Instructions given. (See in  Nurse's note)    Optimization:  Anesthesia Preop Clinic Assessment  Indicated-not required for this procedure    Medical Opinion Indicated-pt had clearance appt with Dr Victor, pt states she has to go back on Monday 3/11 for BP check         Plan:    Testing:  Hemoglobin      Consultation:Patient's PCP for a statement of optimization per surgeon request     Patient  has previously scheduled Medical Appointment: 3/18 preop with surgeon    Navigation: Tests Scheduled. -will schedule 3/18 if not obtained form [facility]             Consults scheduled.             Results will be tracked by Preop Clinic.

## 2019-03-11 ENCOUNTER — TELEPHONE (OUTPATIENT)
Dept: PLASTIC SURGERY | Facility: CLINIC | Age: 29
End: 2019-03-11

## 2019-03-11 NOTE — TELEPHONE ENCOUNTER
No answer and voicemail full. Need to resched postop apt made for 3/26 9will be at  B    Patient has pre-op apt 3/18. Can discuss at her apt

## 2019-03-14 ENCOUNTER — OFFICE VISIT (OUTPATIENT)
Dept: CARDIOLOGY | Facility: CLINIC | Age: 29
End: 2019-03-14
Payer: COMMERCIAL

## 2019-03-14 VITALS
DIASTOLIC BLOOD PRESSURE: 80 MMHG | WEIGHT: 184.19 LBS | BODY MASS INDEX: 31.45 KG/M2 | HEART RATE: 89 BPM | SYSTOLIC BLOOD PRESSURE: 122 MMHG | HEIGHT: 64 IN | OXYGEN SATURATION: 100 %

## 2019-03-14 DIAGNOSIS — I10 ESSENTIAL HYPERTENSION: Primary | ICD-10-CM

## 2019-03-14 DIAGNOSIS — R01.1 HEART MURMUR PREVIOUSLY UNDIAGNOSED: ICD-10-CM

## 2019-03-14 DIAGNOSIS — R60.0 LOCALIZED EDEMA: ICD-10-CM

## 2019-03-14 DIAGNOSIS — E78.6 LOW HDL (UNDER 40): ICD-10-CM

## 2019-03-14 DIAGNOSIS — I49.8 SINUS ARRHYTHMIA: ICD-10-CM

## 2019-03-14 DIAGNOSIS — Z98.890 S/P LEEP: ICD-10-CM

## 2019-03-14 PROCEDURE — 99204 OFFICE O/P NEW MOD 45 MIN: CPT | Mod: S$GLB,,, | Performed by: INTERNAL MEDICINE

## 2019-03-14 PROCEDURE — 3079F PR MOST RECENT DIASTOLIC BLOOD PRESSURE 80-89 MM HG: ICD-10-PCS | Mod: CPTII,S$GLB,, | Performed by: INTERNAL MEDICINE

## 2019-03-14 PROCEDURE — 99999 PR PBB SHADOW E&M-EST. PATIENT-LVL III: ICD-10-PCS | Mod: PBBFAC,,, | Performed by: INTERNAL MEDICINE

## 2019-03-14 PROCEDURE — 3074F PR MOST RECENT SYSTOLIC BLOOD PRESSURE < 130 MM HG: ICD-10-PCS | Mod: CPTII,S$GLB,, | Performed by: INTERNAL MEDICINE

## 2019-03-14 PROCEDURE — 3079F DIAST BP 80-89 MM HG: CPT | Mod: CPTII,S$GLB,, | Performed by: INTERNAL MEDICINE

## 2019-03-14 PROCEDURE — 3074F SYST BP LT 130 MM HG: CPT | Mod: CPTII,S$GLB,, | Performed by: INTERNAL MEDICINE

## 2019-03-14 PROCEDURE — 3008F BODY MASS INDEX DOCD: CPT | Mod: CPTII,S$GLB,, | Performed by: INTERNAL MEDICINE

## 2019-03-14 PROCEDURE — 3008F PR BODY MASS INDEX (BMI) DOCUMENTED: ICD-10-PCS | Mod: CPTII,S$GLB,, | Performed by: INTERNAL MEDICINE

## 2019-03-14 PROCEDURE — 99204 PR OFFICE/OUTPT VISIT, NEW, LEVL IV, 45-59 MIN: ICD-10-PCS | Mod: S$GLB,,, | Performed by: INTERNAL MEDICINE

## 2019-03-14 PROCEDURE — 99999 PR PBB SHADOW E&M-EST. PATIENT-LVL III: CPT | Mod: PBBFAC,,, | Performed by: INTERNAL MEDICINE

## 2019-03-14 RX ORDER — AMLODIPINE BESYLATE 5 MG/1
TABLET ORAL
COMMUNITY
End: 2020-03-13

## 2019-03-14 NOTE — LETTER
March 14, 2019      Jonathan Victor MD  [address] [address] [address]           [facility]  [address] [place] [address], [address]  [facility] [address]  Phone: [phone]          Patient: Germaine Armando   MR Number: [id number]   YOB: 1990   Date of Visit: 3/14/2019       Dear Dr. Jonathan Victor:    Thank you for referring Germaine Armando to me for evaluation. Attached you will find relevant portions of my assessment and plan of care.    If you have questions, please do not hesitate to call me. I look forward to following Germaine Armadno along with you.    Sincerely,    Sammy Martinez MD    Enclosure  CC:  No Recipients    If you would like to receive this communication electronically, please contact [email or web address] or [phone] to request more information on [facility] Link access.    For providers and/or their staff who would like to refer a patient to [facility], please contact us through our one-stop-shop provider referral line, [facility], at [phone].    If you feel you have received this communication in error or would no longer like to receive these types of communications, please e-mail [email or web address]

## 2019-03-14 NOTE — PROGRESS NOTES
Subjective:      Patient ID: Germaine Armando is a 28 y.o. female.    Chief Complaint: Pre-op Exam (Breast reduction next friday)    HPI:  Pt is scheduled for breast reduction surgery at the main campus 3/22/19.  Pt referred by PCP due to pre-op ECG showing a marked sinus arrhythmia.  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    Review of Systems   Cardiovascular: Negative for chest pain, claudication, dyspnea on exertion, irregular heartbeat, leg swelling, near-syncope, orthopnea, palpitations and syncope.      Pt has intermittent back pains.  Pt had LEEP surgery on cervix and C section without complications.  Past Medical History:   Diagnosis Date    Abnormal Pap smear of cervix 2018    ASCUS , Negative HPV    Cancer     Hypertension         Past Surgical History:   Procedure Laterality Date     SECTION      LEEP (LOOP ELECTROSURGICAL EXCISION PROCEDURE) N/A 2019    Performed by Judith Mane MD at [facility] OR       Family History   Problem Relation Age of Onset    Hypertension Mother     No Known Problems Father     Heart disease Maternal Grandfather        Social History     Socioeconomic History    Marital status: Single     Spouse name: None    Number of children: None    Years of education: None    Highest education level: None   Social Needs    Financial resource strain: None    Food insecurity - worry: None    Food insecurity - inability: None    Transportation needs - medical: None    Transportation needs - non-medical: None   Occupational History    None   Tobacco Use    Smoking status: Never Smoker    Smokeless tobacco: Never Used   Substance and Sexual Activity    Alcohol use: No     Alcohol/week: 1.8 oz     Types: 3 Shots of liquor per week     Frequency: Monthly or less     Drinks per session: 3 or 4     Binge frequency: Never     Comment: occasionally    Drug use: No    Sexual activity: No     Partners: Male      "Birth control/protection: Injection   Other Topics Concern    None   Social History Narrative    None       Current Outpatient Medications on File Prior to Visit   Medication Sig Dispense Refill    amLODIPine (NORVASC) 5 MG tablet amlodipine 5 mg tablet   TAKE ONE TABLET BY MOUTH EVERY DAY      HYDROcodone-acetaminophen (NORCO) 5-325 mg per tablet Take 1 tablet by mouth every 6 (six) hours as needed for Pain. 12 tablet 0    medroxyPROGESTERone (DEPO-PROVERA) 150 mg/mL injection Inject 150 mg into the muscle every 3 (three) months.       Current Facility-Administered Medications on File Prior to Visit   Medication Dose Route Frequency Provider Last Rate Last Dose    naproxen sodium tablet 550 mg  550 mg Oral Q12H PRN Camila Moore MD   550 mg at 01/25/19 0836       Review of patient's allergies indicates:  No Known Allergies  Objective:     Vitals:    03/14/19 1411   BP: 122/80   BP Location: Left arm   Patient Position: Sitting   BP Method: Large (Automatic)   Pulse: 89   SpO2: 100%   Weight: 83.6 kg (184 lb 3.1 oz)   Height: 5' 4" (1.626 m)        Physical Exam   Constitutional: She is oriented to person, place, and time. She appears well-developed and well-nourished.   Eyes: No scleral icterus.   Neck: No JVD present. Carotid bruit is not present.   Cardiovascular: Normal rate and regular rhythm. Exam reveals no gallop.   Murmur (II/VI systolic murmur) heard.  Pulses:       Dorsalis pedis pulses are 2+ on the right side, and 2+ on the left side.   Pulmonary/Chest: Breath sounds normal.   Abdominal: Soft. She exhibits no pulsatile midline mass and no mass. There is no hepatosplenomegaly. There is no tenderness.   Musculoskeletal: She exhibits edema (trace pitting edema bilaterally).   Neurological: She is alert and oriented to person, place, and time.   Skin: Skin is warm and dry.   Psychiatric: She has a normal mood and affect. Her behavior is normal. Judgment and thought content normal.   Vitals " reviewed.     ECG 2/25/19: NSR with sinus arrhythmia, WNL    CBC and CMP WNL except     HDL 28  LDL 69    CXR WNL  Assessment:     1. Essential hypertension    2. Sinus arrhythmia    3. S/P LEEP    4. Low HDL (under 40)    5. Localized edema    6. Heart murmur previously undiagnosed      Plan:   Germaine was seen today for pre-op exam.    Diagnoses and all orders for this visit:    Essential hypertension    Sinus arrhythmia    S/P LEEP    Low HDL (under 40)    Localized edema    Heart murmur previously undiagnosed  -     Transthoracic echo (TTE) complete (Cupid Only); Future     Sinus arrhythmia is a normal variant    Heart murmur is likely functional.  Nevertheless will get an echocardiogram with doppler    Edema is a side effect of naproxen and amlodipine    Pt instructed to hold naproxen before surgery    Consider changing amlodipine to HCTZ due to edema    Pt advised to exercise and lose weight    Pt is medically stable for breast reduction surgery.    F/u with Dr Victor    No Follow-up on file.

## 2019-03-15 ENCOUNTER — TELEPHONE (OUTPATIENT)
Dept: PLASTIC SURGERY | Facility: CLINIC | Age: 29
End: 2019-03-15

## 2019-03-15 NOTE — TELEPHONE ENCOUNTER
called pt to remind her of appt 3/18 at 11:00am. Pt needed the adress again and after that she confirmed that she would be at scheduled appt.

## 2019-03-18 ENCOUNTER — HOSPITAL ENCOUNTER (OUTPATIENT)
Dept: CARDIOLOGY | Facility: CLINIC | Age: 29
Discharge: HOME | End: 2019-03-18
Attending: INTERNAL MEDICINE
Payer: COMMERCIAL

## 2019-03-18 ENCOUNTER — HOSPITAL ENCOUNTER (OUTPATIENT)
Dept: RADIOLOGY | Facility: HOSPITAL | Age: 29
Discharge: HOME | End: 2019-03-18
Attending: SURGERY
Payer: COMMERCIAL

## 2019-03-18 ENCOUNTER — OFFICE VISIT (OUTPATIENT)
Dept: PLASTIC SURGERY | Facility: CLINIC | Age: 29
End: 2019-03-18
Payer: COMMERCIAL

## 2019-03-18 ENCOUNTER — TELEPHONE (OUTPATIENT)
Dept: SURGERY | Facility: CLINIC | Age: 29
End: 2019-03-18

## 2019-03-18 VITALS
SYSTOLIC BLOOD PRESSURE: 122 MMHG | HEIGHT: 64 IN | BODY MASS INDEX: 32.27 KG/M2 | HEART RATE: 76 BPM | DIASTOLIC BLOOD PRESSURE: 80 MMHG | WEIGHT: 189 LBS

## 2019-03-18 VITALS
DIASTOLIC BLOOD PRESSURE: 86 MMHG | SYSTOLIC BLOOD PRESSURE: 142 MMHG | BODY MASS INDEX: 31.52 KG/M2 | WEIGHT: 183.63 LBS | HEART RATE: 87 BPM

## 2019-03-18 DIAGNOSIS — N62 MACROMASTIA: ICD-10-CM

## 2019-03-18 DIAGNOSIS — N62 MACROMASTIA: Primary | ICD-10-CM

## 2019-03-18 DIAGNOSIS — R92.8 BREAST LESION ON MAMMOGRAPHY: Primary | ICD-10-CM

## 2019-03-18 DIAGNOSIS — R01.1 HEART MURMUR PREVIOUSLY UNDIAGNOSED: ICD-10-CM

## 2019-03-18 LAB
ASCENDING AORTA: 2.95 CM
AV INDEX (PROSTH): 0.77
AV MEAN GRADIENT: 4.36 MMHG
AV PEAK GRADIENT: 8.41 MMHG
AV VALVE AREA: 2.58 CM2
AV VELOCITY RATIO: 0.74
BSA FOR ECHO PROCEDURE: 1.97 M2
CV ECHO LV RWT: 0.43 CM
DOP CALC AO PEAK VEL: 1.45 M/S
DOP CALC AO VTI: 28.9 CM
DOP CALC LVOT AREA: 3.36 CM2
DOP CALC LVOT DIAMETER: 2.07 CM
DOP CALC LVOT PEAK VEL: 1.07 M/S
DOP CALC LVOT STROKE VOLUME: 74.44 CM3
DOP CALCLVOT PEAK VEL VTI: 22.13 CM
E WAVE DECELERATION TIME: 222.83 MSEC
E/A RATIO: 1.79
E/E' RATIO: 8.26
ECHO LV POSTERIOR WALL: 0.89 CM (ref 0.6–1.1)
FRACTIONAL SHORTENING: 31 % (ref 28–44)
INTERVENTRICULAR SEPTUM: 0.87 CM (ref 0.6–1.1)
LA MAJOR: 5.1 CM
LA MINOR: 5.18 CM
LA WIDTH: 3.44 CM
LEFT ATRIUM SIZE: 2.89 CM
LEFT ATRIUM VOLUME INDEX: 22.7 ML/M2
LEFT ATRIUM VOLUME: 43.43 CM3
LEFT INTERNAL DIMENSION IN SYSTOLE: 2.87 CM (ref 2.1–4)
LEFT VENTRICLE DIASTOLIC VOLUME INDEX: 39.99 ML/M2
LEFT VENTRICLE DIASTOLIC VOLUME: 76.37 ML
LEFT VENTRICLE MASS INDEX: 59.1 G/M2
LEFT VENTRICLE SYSTOLIC VOLUME INDEX: 16.5 ML/M2
LEFT VENTRICLE SYSTOLIC VOLUME: 31.53 ML
LEFT VENTRICULAR INTERNAL DIMENSION IN DIASTOLE: 4.15 CM (ref 3.5–6)
LEFT VENTRICULAR MASS: 112.88 G
LV LATERAL E/E' RATIO: 6.79
LV SEPTAL E/E' RATIO: 10.56
MV PEAK A VEL: 0.53 M/S
MV PEAK E VEL: 0.95 M/S
PISA TR MAX VEL: 2.03 M/S
PULM VEIN S/D RATIO: 1.59
PV PEAK D VEL: 0.34 M/S
PV PEAK S VEL: 0.54 M/S
RA MAJOR: 4.34 CM
RA PRESSURE: 3 MMHG
RA WIDTH: 2.17 CM
RIGHT VENTRICULAR END-DIASTOLIC DIMENSION: 2.38 CM
SINUS: 2.72 CM
STJ: 2.09 CM
TDI LATERAL: 0.14
TDI SEPTAL: 0.09
TDI: 0.12
TR MAX PG: 16.48 MMHG
TRICUSPID ANNULAR PLANE SYSTOLIC EXCURSION: 2.04 CM
TV REST PULMONARY ARTERY PRESSURE: 19 MMHG

## 2019-03-18 PROCEDURE — 3074F PR MOST RECENT SYSTOLIC BLOOD PRESSURE < 130 MM HG: ICD-10-PCS | Mod: CPTII,S$GLB,, | Performed by: SURGERY

## 2019-03-18 PROCEDURE — 77066 MAMMO DIGITAL DIAGNOSTIC BILAT WITH TOMOSYNTHESIS_CAD: ICD-10-PCS | Mod: 26,,, | Performed by: RADIOLOGY

## 2019-03-18 PROCEDURE — 99999 PR PBB SHADOW E&M-EST. PATIENT-LVL III: ICD-10-PCS | Mod: PBBFAC,,, | Performed by: SURGERY

## 2019-03-18 PROCEDURE — G0279 TOMOSYNTHESIS, MAMMO: HCPCS | Mod: 26,,, | Performed by: RADIOLOGY

## 2019-03-18 PROCEDURE — 77066 DX MAMMO INCL CAD BI: CPT | Mod: 26,,, | Performed by: RADIOLOGY

## 2019-03-18 PROCEDURE — 3008F PR BODY MASS INDEX (BMI) DOCUMENTED: ICD-10-PCS | Mod: CPTII,S$GLB,, | Performed by: SURGERY

## 2019-03-18 PROCEDURE — 93306 TTE W/DOPPLER COMPLETE: CPT | Mod: S$GLB,,, | Performed by: INTERNAL MEDICINE

## 2019-03-18 PROCEDURE — 3079F DIAST BP 80-89 MM HG: CPT | Mod: CPTII,S$GLB,, | Performed by: SURGERY

## 2019-03-18 PROCEDURE — 76642 ULTRASOUND BREAST LIMITED: CPT | Mod: TC,PO,LT

## 2019-03-18 PROCEDURE — 76642 ULTRASOUND BREAST LIMITED: CPT | Mod: 26,LT,, | Performed by: RADIOLOGY

## 2019-03-18 PROCEDURE — 99214 OFFICE O/P EST MOD 30 MIN: CPT | Mod: S$GLB,,, | Performed by: SURGERY

## 2019-03-18 PROCEDURE — 3074F SYST BP LT 130 MM HG: CPT | Mod: CPTII,S$GLB,, | Performed by: SURGERY

## 2019-03-18 PROCEDURE — 99214 PR OFFICE/OUTPT VISIT, EST, LEVL IV, 30-39 MIN: ICD-10-PCS | Mod: S$GLB,,, | Performed by: SURGERY

## 2019-03-18 PROCEDURE — 3079F PR MOST RECENT DIASTOLIC BLOOD PRESSURE 80-89 MM HG: ICD-10-PCS | Mod: CPTII,S$GLB,, | Performed by: SURGERY

## 2019-03-18 PROCEDURE — 76642 US BREAST LEFT LIMITED: ICD-10-PCS | Mod: 26,LT,, | Performed by: RADIOLOGY

## 2019-03-18 PROCEDURE — 93306 TRANSTHORACIC ECHO (TTE) COMPLETE (CUPID ONLY): ICD-10-PCS | Mod: S$GLB,,, | Performed by: INTERNAL MEDICINE

## 2019-03-18 PROCEDURE — 3008F BODY MASS INDEX DOCD: CPT | Mod: CPTII,S$GLB,, | Performed by: SURGERY

## 2019-03-18 PROCEDURE — 77062 BREAST TOMOSYNTHESIS BI: CPT | Mod: TC,PO

## 2019-03-18 PROCEDURE — G0279 MAMMO DIGITAL DIAGNOSTIC BILAT WITH TOMOSYNTHESIS_CAD: ICD-10-PCS | Mod: 26,,, | Performed by: RADIOLOGY

## 2019-03-18 PROCEDURE — 99999 PR PBB SHADOW E&M-EST. PATIENT-LVL III: CPT | Mod: PBBFAC,,, | Performed by: SURGERY

## 2019-03-18 RX ORDER — ONDANSETRON 4 MG/1
8 TABLET, ORALLY DISINTEGRATING ORAL EVERY 6 HOURS PRN
Qty: 10 TABLET | Refills: 0 | Status: SHIPPED | OUTPATIENT
Start: 2019-03-18 | End: 2020-03-13

## 2019-03-18 RX ORDER — DOCUSATE SODIUM 100 MG/1
100 CAPSULE, LIQUID FILLED ORAL 2 TIMES DAILY
Refills: 0 | COMMUNITY
Start: 2019-03-18 | End: 2020-03-13

## 2019-03-18 RX ORDER — OXYCODONE HYDROCHLORIDE 5 MG/1
5 TABLET ORAL EVERY 4 HOURS PRN
Qty: 30 TABLET | Refills: 0 | Status: SHIPPED | OUTPATIENT
Start: 2019-03-18 | End: 2020-03-13

## 2019-03-18 NOTE — TELEPHONE ENCOUNTER
Called patient regarding mammogram result.  Advised her to get an MRI breast preop.  I called nicholas, Glenys at x [phone] and spoke with  [id number] Efrain dunlap.  Appointment scheduled for 6 am.  I provided Germaine with directions.  She confirmed that she will complete this study tomorrow morning.      Plastic & Reconstructive Surgery  Microsurgery  [facility]  c/o Bill Beebe M.D.  [facility]  [address]  [address] [place] [address]    Work [phone]  Toll free [phone]  If no answer [phone]

## 2019-03-18 NOTE — LETTER
March 19, 2019      Germaine Armando  [address]  [place] [address]             [facility] - Plastic S[facility] [place]  [address] [facility] [address]  Phone: [phone]  Fax: [phone] Patient: Germaine Armando  MRN: [id number]  YOB: 1990  Date of Visit: 03/19/2019    To Whom It May Concern:    Germaine Reeves was seen in the Plastic Surgery Clinic on 03/19/2019. She is scheduled to have a procedure on 3/21/19.  I am seeing her for follow up on 3/27/19.  I advised that she take at least 2 weeks off from work while she recovers.  I also advised that do only light duty activity (no lifting objects heavier than 5 lbs) for at least 6 weeks post operatively.  If you have any questions or concerns, or if I can be of further assistance, please do not hesitate to contact my office.    Sincerely,          Corky Tena MD  Section of Plastic & Reconstructive Surgery  [facility]

## 2019-03-19 ENCOUNTER — TELEPHONE (OUTPATIENT)
Dept: CARDIOLOGY | Facility: CLINIC | Age: 29
End: 2019-03-19

## 2019-03-19 ENCOUNTER — HOSPITAL ENCOUNTER (OUTPATIENT)
Dept: RADIOLOGY | Facility: HOSPITAL | Age: 29
Discharge: HOME | End: 2019-03-19
Attending: SURGERY
Payer: COMMERCIAL

## 2019-03-19 ENCOUNTER — TELEPHONE (OUTPATIENT)
Dept: SURGERY | Facility: CLINIC | Age: 29
End: 2019-03-19

## 2019-03-19 DIAGNOSIS — R92.8 BREAST LESION ON MAMMOGRAPHY: ICD-10-CM

## 2019-03-19 PROCEDURE — 77049 MRI BREAST C-+ W/CAD BI: CPT | Mod: TC

## 2019-03-19 PROCEDURE — 77049 MRI BREAST W/WO CONTRAST, W/CAD, BILATERAL: ICD-10-PCS | Mod: 26,,, | Performed by: RADIOLOGY

## 2019-03-19 PROCEDURE — 25500020 PHARM REV CODE 255: Performed by: SURGERY

## 2019-03-19 PROCEDURE — 77049 MRI BREAST C-+ W/CAD BI: CPT | Mod: 26,,, | Performed by: RADIOLOGY

## 2019-03-19 PROCEDURE — A9577 INJ MULTIHANCE: HCPCS | Performed by: SURGERY

## 2019-03-19 RX ADMIN — GADOBENATE DIMEGLUMINE 18 ML: 529 INJECTION, SOLUTION INTRAVENOUS at 06:03

## 2019-03-19 NOTE — TELEPHONE ENCOUNTER
Left VM on patient phone to call back regarding test results.  OK to proceed with surgery as scheduled on Thursday.

## 2019-03-19 NOTE — PROGRESS NOTES
Radha presented for a preoperative visit.       We discussed the nature of the procedure, location of incisions, expected post op care.  I explained that I would place drains at the time of surgery.  I gave her a sheet with post operative wound care and recovery issues, including a drain log and drain care steps.  She will recover after the Mardi [place] break.  I explained to her that her drains should not be in longer than 1 week.  I gave her post op rx's, and we scheduled her perioperative appointments.  Plan will be for outpatient surgery.  I did explain that I would remove more volume on her larger breast, her left side.  Estimate pre-operatively is 550 grams on right.  She is ok with removing more but would prefer not to be flat chested.  She says that she can remove her nipple rings before surgery.    I did explain that I could not guarantee a cup size, but I would take great care to create an aesthetic breast.  Risks, benefits, and alternatives were discussed.  The bilateral breast reduction procedure was discussed in detail with the patient as were the risks and possible complications which include but are not limited to bleeding, scarring, infection, pain, numbness, asymmetry, deformity, large open wound, skin necrosis, wound dehiscence, delayed healing, permanent or temporary loss of sensation to the nipple(s), partial or total loss of the nipple(s), free nipple graft, death, brain damage, pulmonary embolus and need for further surgery.   I told her that I would not plan on free nipple grafting but this is a possibility.      She states that she is aware that she will have smaller breasts, and that I cannot guarantee a cup size.  She understands that after this procedure she may no longer plan on breast feeding.       Personally reviewed her mammogram results today.  Ordered because she is almost 29.  She has no known history of breast cancer.  Shadow in her left breast was noted after mammogram and  ultrasound.  Read as BIRAD 3.  Because her breast density and appearance on mammogram will change, I elected to order breast MRI to ensure that there is no concerning lesions before proceeding.      I personally reviewed her bilateral breast MRI- no evidence of maligancy    I personally reviewed her pre-operative labs.  Hgb 12,     Cardiology clearance and Internal medicine clearance obtained.  Has functional murmur.  TTE to be completed today    Letter for work clearance written.      All prescriptions written to pharmacy.  Confirmed location.      Written consent obtained.      30 minutes of face to face time, of which greater than fifty percent of the total visit was  counseling/coordinating care      Plastic & Reconstructive Surgery  Microsurgery  [facility]  c/o Bill Beebe M.D.  [facility]  [address]  [address] [place] [address]]  Work [phone]  Toll free [phone]     FOLLOW-UP:  Please call the office to confirm a follow up time.  I should see you on 3/27 at 11:30  to check your surgical sites and drains.     To schedule appointment:  For all life-threatening emergencies, please call [phone]  For all other concerns regarding your plastic surgery, you may call the office at: [phone], toll free [phone].       BATHING  No tub soaking, lotions of creams to surgical sites until cleared by your doctor.  Ok to shower post op day 1.  No scrubbing or soaking of incisions.  Pat wounds dry.  Do not remove the tapes over your surgical sites.  If the edges become , trim the loose ends.       WOUND CARE  Maintain prineo dressings over your breast incision.  If it becomes loosened, ok to trim the loosened edge, pat dry.  Ok to remove the dressing on post op day 1 and shower.  You may want to use a kerlix necklace or pouch to offload the bulbs while sitting in a shower.  Do not tub soak your incisions.  Wear the surgical bra  for comfort, but you should not wear a bra with underwires.      SUTURES  Do not remove any sutures.  These will be removed in the office.     ACTIVITY  Encourage po intake  You may resume light activity (walking, usual activities around the home).  Avoid heavy lifting, running, swimming, strenuous activity for at least 3 weeks.    Don't lift anything heavier than a milk jug for at least 2 weeks.  I would prefer that your drain be removed before then.    Avoid long-distance travel for at least 3 weeks.  If you have long car rides, hydrate yourself well.  You can wear compressive stockings to avoid the blood in your legs from sitting still. Perform ankle circles while awake to prevent stasis in your legs.  Get out of bed as soon as you can and start walking around.         THINGS TO WATCH FOR:  If you notice new pain, redness, abnormal drainage, abnormal fluid collection, fever, or wound healing issues please notify your provider immediately.  If there are issues with your surgical sites, we would rather know about them early, so that an appropriate plan of action can be followed.  If notified in a timely manner, this kind of post op care should be coordinated by Dr. Beebe rather than a surgeon or emergency person you are not familiar with.     If you are short of breath or have new leg pain and/or having difficulty breathing, please go to your nearest emergency room.       MEDICATIONS  Take pain medication as needed:  Tylenol (acetominophen) 650 mg every 6 hours is recommended for mild pain.  If you are taking a narcotic mixed with acetaminophen, wait at least 4 HOURS after taking other acetaminophen-containing preparations (ie. Tylenol)  DO NOT exceed 4 grams of Tylenol in a 24 h period      SCAR MANAGEMENT  Scars may take over 1 year to mature.  Some scars will remain pink, dark purple, and possibly raised for 6-9 months after surgery.  After one year, scars often become flatter, smoother, and may change  color.  After removal of the tegaderm/tape, suture removal, or when glue was used, apply a thin layer of Aquaphor (available at any drugstore) or antibiotic ointment to the scars for another 2 weeks.     Begin silicone when scars smooth, generally starting about 6 weeks after surgery.  Please discuss this with Dr. Beebe before proceeding.  Medical grade silicone gel is available on companies' web sites or on amazon.com  Brands recommended:  - Scarfade (scarfade.com)  - NeuGel ( )  - Kelocote (kelocote.com)       Drainage Tube  Your doctor discharges you with a Adrian-Catalan drainage tube. These tubes are in place to help prevent fluid from collecting around your prosthesis.  It is important that fluid does not stay in the cavity, because it can cause healing difficulties or implant infection.  It helps drain and collect blood and body fluid after surgery. This can prevent swelling and reduces the risk for infection. The tube is held in place by a few stitches.     Drain Care  · Dont sleep on the same side as the tube.  · Secure the tube and bag inside your clothing with a safety pin. This helps keep the tube from being pulled out.  · Empty your drain at least three times a day.  Regularly strip the tubing from your drain stitch to the bulb to prevent clots from accumulating.  Empty it when you notice it is half full with fluid.  When it gets beyond half way full, the suction mechanism does not work as well and the fluid collections in your wounds.  Wash and dry your hands before emptying the drain.  How to use the VENKATA bulb:  ? Lift the opening on the drain.  ? Drain the fluid into a measuring cup.  ? Record the amount of fluid each time you empty the drain. Include the date and time it was emptied. Share this information with your doctor on your next visit.  ? Squeeze the bulb with your hands until you hear air coming out of the bulb if your doctor has instructed you to do so (sometimes the bulb is used as a  reservoir without suction). Check with your doctor about specific drain instructions.  ? Close the opening.  · Change the dressing around the tube every day.  ? Wash your hands.  ? Remove the old bandage.  ? Wash your hands again.  ? Wet a cotton swab and clean the skin around the incision and tube site. Use normal saline solution (salt and water). Or, you can use warm, soapy water.  ? Put a new bandage on the incision and tube site. Make the bandage large enough to cover the whole incision area.  ? Tape the bandage in place.  · Keep the bandage and tube site dry when you shower. Ask your healthcare provider about the best way to do this.  ? Stripping the tube helps keep blood clots from blocking the tube.   ? Hold the tubing where it leaves the skin, with one hand. This keeps it from pulling on the skin.  ? Pinch the tubing with the thumb and first finger of your other hand.  ? Slowly and firmly pull your thumb and first finger down the tubing. You may find it helpful to hold an alcohol swab between your fingers and the tube to lubricate the tubing.  ? If the pulling hurts or feels like its coming out of the skin, stop. Begin again more gently.    Follow-up care  Make a follow-up appointment as directed by our staff.     When to seek medical care  Call your healthcare provider right away if you have any of the following:  · New or increased pain around the tube  · Redness, swelling, or warmth around the incision or tube  · Drainage that is foul-smelling  · Vomiting  · Fever of 100.4°F (38°C)  · Fluid leaking around the tube  · Incision seems not to be healing  · Stitches become loose  · Tube falls out or breaks  · Drainage that changes from light pink to dark red  · Blood clots in the drainage bulb  · A sudden increase or decrease in the amount of drainage (over 30 mL)     Germaine's Drain Record (right breast)     Date Emptied Time Emptied Amount in [place]                                                                                                                      Germaine's Drain Record (left breast)     Date Emptied Time Emptied Amount in [place]                                                                                                                           Plastic & Reconstructive Surgery  Microsurgery  [facility]  c/o Bill Beebe M.D.  [facility]  [address]  [address] [place] [address]     Work [phone]  Toll free [phone]  If no answer [phone]

## 2019-03-19 NOTE — TELEPHONE ENCOUNTER
Echocardiogram shows normal LVEF and normal valves and concentric remodeling (c/w HBP)  Pt reassured.

## 2019-03-20 ENCOUNTER — TELEPHONE (OUTPATIENT)
Dept: PLASTIC SURGERY | Facility: CLINIC | Age: 29
End: 2019-03-20

## 2019-03-20 NOTE — TELEPHONE ENCOUNTER
Called pt to let her know to arrive at 5am [facility] . Preop education reinforced . Pt said that anesthesia called her and told her she could take blood pressure medicine with a sip of water. Pt verbalized understanding of all other preop instructions.           ----- Message from Joselito Peguero sent at 3/20/2019  2:06 PM CDT -----  Patient Returning Call from [facility]    Who Left Message for Patient:Annelise  Communication Preference:[phone]  Additional Information:

## 2019-03-21 ENCOUNTER — HOSPITAL ENCOUNTER (OUTPATIENT)
Facility: HOSPITAL | Age: 29
Discharge: HOME | End: 2019-03-21
Attending: SURGERY | Admitting: SURGERY
Payer: COMMERCIAL

## 2019-03-21 ENCOUNTER — ANESTHESIA (OUTPATIENT)
Dept: SURGERY | Facility: HOSPITAL | Age: 29
End: 2019-03-21
Payer: COMMERCIAL

## 2019-03-21 VITALS
SYSTOLIC BLOOD PRESSURE: 127 MMHG | TEMPERATURE: 98 F | OXYGEN SATURATION: 100 % | DIASTOLIC BLOOD PRESSURE: 73 MMHG | RESPIRATION RATE: 18 BRPM | WEIGHT: 180 LBS | BODY MASS INDEX: 30.73 KG/M2 | HEART RATE: 73 BPM | HEIGHT: 64 IN

## 2019-03-21 DIAGNOSIS — N62 MACROMASTIA: Primary | ICD-10-CM

## 2019-03-21 LAB
B-HCG UR QL: NEGATIVE
CTP QC/QA: YES

## 2019-03-21 PROCEDURE — 63600175 PHARM REV CODE 636 W HCPCS: Performed by: STUDENT IN AN ORGANIZED HEALTH CARE EDUCATION/TRAINING PROGRAM

## 2019-03-21 PROCEDURE — 71000016 HC POSTOP RECOV ADDL HR: Performed by: SURGERY

## 2019-03-21 PROCEDURE — 71000039 HC RECOVERY, EACH ADD'L HOUR: Performed by: SURGERY

## 2019-03-21 PROCEDURE — C1729 CATH, DRAINAGE: HCPCS | Performed by: SURGERY

## 2019-03-21 PROCEDURE — 94761 N-INVAS EAR/PLS OXIMETRY MLT: CPT | Mod: 59

## 2019-03-21 PROCEDURE — D9220A PRA ANESTHESIA: Mod: CRNA,,, | Performed by: NURSE ANESTHETIST, CERTIFIED REGISTERED

## 2019-03-21 PROCEDURE — 63600175 PHARM REV CODE 636 W HCPCS: Performed by: NURSE ANESTHETIST, CERTIFIED REGISTERED

## 2019-03-21 PROCEDURE — 25000003 PHARM REV CODE 250: Performed by: NURSE ANESTHETIST, CERTIFIED REGISTERED

## 2019-03-21 PROCEDURE — 71000033 HC RECOVERY, INTIAL HOUR: Performed by: SURGERY

## 2019-03-21 PROCEDURE — 88305 TISSUE SPECIMEN TO PATHOLOGY - SURGERY: ICD-10-PCS | Mod: 26,,, | Performed by: PATHOLOGY

## 2019-03-21 PROCEDURE — 25000003 PHARM REV CODE 250: Performed by: SURGERY

## 2019-03-21 PROCEDURE — 63600175 PHARM REV CODE 636 W HCPCS

## 2019-03-21 PROCEDURE — C9290 INJ, BUPIVACAINE LIPOSOME: HCPCS | Performed by: SURGERY

## 2019-03-21 PROCEDURE — 37000009 HC ANESTHESIA EA ADD 15 MINS: Performed by: SURGERY

## 2019-03-21 PROCEDURE — 63600175 PHARM REV CODE 636 W HCPCS: Performed by: ANESTHESIOLOGY

## 2019-03-21 PROCEDURE — 36000707: Performed by: SURGERY

## 2019-03-21 PROCEDURE — 25000003 PHARM REV CODE 250: Performed by: STUDENT IN AN ORGANIZED HEALTH CARE EDUCATION/TRAINING PROGRAM

## 2019-03-21 PROCEDURE — 36000706: Performed by: SURGERY

## 2019-03-21 PROCEDURE — 37000008 HC ANESTHESIA 1ST 15 MINUTES: Performed by: SURGERY

## 2019-03-21 PROCEDURE — D9220A PRA ANESTHESIA: Mod: ANES,,, | Performed by: ANESTHESIOLOGY

## 2019-03-21 PROCEDURE — 00402 ANES INTEG SYS RCNSTV BREAST: CPT | Performed by: SURGERY

## 2019-03-21 PROCEDURE — 25000003 PHARM REV CODE 250: Performed by: ANESTHESIOLOGY

## 2019-03-21 PROCEDURE — D9220A PRA ANESTHESIA: ICD-10-PCS | Mod: ANES,,, | Performed by: ANESTHESIOLOGY

## 2019-03-21 PROCEDURE — 63600175 PHARM REV CODE 636 W HCPCS: Performed by: SURGERY

## 2019-03-21 PROCEDURE — 88305 TISSUE EXAM BY PATHOLOGIST: CPT | Mod: 26,,, | Performed by: PATHOLOGY

## 2019-03-21 PROCEDURE — 27201423 OPTIME MED/SURG SUP & DEVICES STERILE SUPPLY: Performed by: SURGERY

## 2019-03-21 PROCEDURE — 15860 PR IV INJ TO TEST BLOOD FLOW IN FLAP/GRAFT: ICD-10-PCS | Mod: 51,,, | Performed by: SURGERY

## 2019-03-21 PROCEDURE — D9220A PRA ANESTHESIA: ICD-10-PCS | Mod: CRNA,,, | Performed by: NURSE ANESTHETIST, CERTIFIED REGISTERED

## 2019-03-21 PROCEDURE — 15860 IV NJX TST VASC FLO FLAP/GRF: CPT | Mod: 51,,, | Performed by: SURGERY

## 2019-03-21 PROCEDURE — 88305 TISSUE EXAM BY PATHOLOGIST: CPT | Performed by: PATHOLOGY

## 2019-03-21 PROCEDURE — 19318 PR REDUCTION OF LARGE BREAST: ICD-10-PCS | Mod: 50,,, | Performed by: SURGERY

## 2019-03-21 PROCEDURE — 71000015 HC POSTOP RECOV 1ST HR: Performed by: SURGERY

## 2019-03-21 PROCEDURE — 19318 BREAST REDUCTION: CPT | Mod: 50,,, | Performed by: SURGERY

## 2019-03-21 RX ORDER — LIDOCAINE HYDROCHLORIDE AND EPINEPHRINE 10; 10 MG/ML; UG/ML
INJECTION, SOLUTION INFILTRATION; PERINEURAL
Status: DISCONTINUED | OUTPATIENT
Start: 2019-03-21 | End: 2019-03-21 | Stop reason: HOSPADM

## 2019-03-21 RX ORDER — CEFAZOLIN SODIUM 1 G/3ML
2 INJECTION, POWDER, FOR SOLUTION INTRAMUSCULAR; INTRAVENOUS
Status: COMPLETED | OUTPATIENT
Start: 2019-03-21 | End: 2019-03-21

## 2019-03-21 RX ORDER — SODIUM CHLORIDE 9 MG/ML
INJECTION, SOLUTION INTRAVENOUS CONTINUOUS
Status: DISCONTINUED | OUTPATIENT
Start: 2019-03-21 | End: 2019-03-21 | Stop reason: HOSPADM

## 2019-03-21 RX ORDER — ONDANSETRON 2 MG/ML
INJECTION INTRAMUSCULAR; INTRAVENOUS
Status: DISCONTINUED | OUTPATIENT
Start: 2019-03-21 | End: 2019-03-21

## 2019-03-21 RX ORDER — ACETAMINOPHEN 10 MG/ML
INJECTION, SOLUTION INTRAVENOUS
Status: DISCONTINUED | OUTPATIENT
Start: 2019-03-21 | End: 2019-03-21

## 2019-03-21 RX ORDER — SODIUM CHLORIDE 0.9 % (FLUSH) 0.9 %
3 SYRINGE (ML) INJECTION
Status: DISCONTINUED | OUTPATIENT
Start: 2019-03-21 | End: 2019-03-21 | Stop reason: HOSPADM

## 2019-03-21 RX ORDER — FENTANYL CITRATE 50 UG/ML
INJECTION, SOLUTION INTRAMUSCULAR; INTRAVENOUS
Status: DISCONTINUED | OUTPATIENT
Start: 2019-03-21 | End: 2019-03-21

## 2019-03-21 RX ORDER — ONDANSETRON 2 MG/ML
4 INJECTION INTRAMUSCULAR; INTRAVENOUS EVERY 12 HOURS PRN
Status: DISCONTINUED | OUTPATIENT
Start: 2019-03-21 | End: 2019-03-21 | Stop reason: HOSPADM

## 2019-03-21 RX ORDER — DEXAMETHASONE SODIUM PHOSPHATE 4 MG/ML
INJECTION, SOLUTION INTRA-ARTICULAR; INTRALESIONAL; INTRAMUSCULAR; INTRAVENOUS; SOFT TISSUE
Status: DISCONTINUED | OUTPATIENT
Start: 2019-03-21 | End: 2019-03-21

## 2019-03-21 RX ORDER — MIDAZOLAM HYDROCHLORIDE 1 MG/ML
INJECTION, SOLUTION INTRAMUSCULAR; INTRAVENOUS
Status: DISCONTINUED | OUTPATIENT
Start: 2019-03-21 | End: 2019-03-21

## 2019-03-21 RX ORDER — NEOSTIGMINE METHYLSULFATE 1 MG/ML
INJECTION, SOLUTION INTRAVENOUS
Status: DISCONTINUED | OUTPATIENT
Start: 2019-03-21 | End: 2019-03-21

## 2019-03-21 RX ORDER — FENTANYL CITRATE 50 UG/ML
INJECTION, SOLUTION INTRAMUSCULAR; INTRAVENOUS
Status: COMPLETED
Start: 2019-03-21 | End: 2019-03-21

## 2019-03-21 RX ORDER — LIDOCAINE HYDROCHLORIDE 10 MG/ML
1 INJECTION, SOLUTION EPIDURAL; INFILTRATION; INTRACAUDAL; PERINEURAL ONCE
Status: COMPLETED | OUTPATIENT
Start: 2019-03-21 | End: 2019-03-21

## 2019-03-21 RX ORDER — PHENYLEPHRINE HYDROCHLORIDE 10 MG/ML
INJECTION INTRAVENOUS
Status: DISCONTINUED | OUTPATIENT
Start: 2019-03-21 | End: 2019-03-21

## 2019-03-21 RX ORDER — PROPOFOL 10 MG/ML
VIAL (ML) INTRAVENOUS
Status: DISCONTINUED | OUTPATIENT
Start: 2019-03-21 | End: 2019-03-21

## 2019-03-21 RX ORDER — KETOROLAC TROMETHAMINE 30 MG/ML
INJECTION, SOLUTION INTRAMUSCULAR; INTRAVENOUS
Status: DISCONTINUED | OUTPATIENT
Start: 2019-03-21 | End: 2019-03-21

## 2019-03-21 RX ORDER — INDOCYANINE GREEN AND WATER 25 MG
KIT INJECTION
Status: DISCONTINUED | OUTPATIENT
Start: 2019-03-21 | End: 2019-03-21

## 2019-03-21 RX ORDER — OXYCODONE AND ACETAMINOPHEN 5; 325 MG/1; MG/1
1 TABLET ORAL EVERY 4 HOURS PRN
Status: DISCONTINUED | OUTPATIENT
Start: 2019-03-21 | End: 2019-03-21 | Stop reason: HOSPADM

## 2019-03-21 RX ORDER — LIDOCAINE HCL/PF 100 MG/5ML
SYRINGE (ML) INTRAVENOUS
Status: DISCONTINUED | OUTPATIENT
Start: 2019-03-21 | End: 2019-03-21

## 2019-03-21 RX ORDER — FENTANYL CITRATE 50 UG/ML
25 INJECTION, SOLUTION INTRAMUSCULAR; INTRAVENOUS EVERY 5 MIN PRN
Status: COMPLETED | OUTPATIENT
Start: 2019-03-21 | End: 2019-03-21

## 2019-03-21 RX ORDER — GLYCOPYRROLATE 0.2 MG/ML
INJECTION INTRAMUSCULAR; INTRAVENOUS
Status: DISCONTINUED | OUTPATIENT
Start: 2019-03-21 | End: 2019-03-21

## 2019-03-21 RX ORDER — ROCURONIUM BROMIDE 10 MG/ML
INJECTION, SOLUTION INTRAVENOUS
Status: DISCONTINUED | OUTPATIENT
Start: 2019-03-21 | End: 2019-03-21

## 2019-03-21 RX ADMIN — FENTANYL CITRATE 50 MCG: 50 INJECTION, SOLUTION INTRAMUSCULAR; INTRAVENOUS at 08:03

## 2019-03-21 RX ADMIN — FENTANYL CITRATE 25 MCG: 50 INJECTION INTRAMUSCULAR; INTRAVENOUS at 12:03

## 2019-03-21 RX ADMIN — OXYCODONE HYDROCHLORIDE AND ACETAMINOPHEN 1 TABLET: 5; 325 TABLET ORAL at 12:03

## 2019-03-21 RX ADMIN — FENTANYL CITRATE 25 MCG: 50 INJECTION, SOLUTION INTRAMUSCULAR; INTRAVENOUS at 09:03

## 2019-03-21 RX ADMIN — ROCURONIUM BROMIDE 20 MG: 10 INJECTION, SOLUTION INTRAVENOUS at 07:03

## 2019-03-21 RX ADMIN — INDOCYANINE GREEN 10 MG: KIT INTRAVENOUS at 11:03

## 2019-03-21 RX ADMIN — ROCURONIUM BROMIDE 30 MG: 10 INJECTION, SOLUTION INTRAVENOUS at 07:03

## 2019-03-21 RX ADMIN — ONDANSETRON 4 MG: 2 INJECTION INTRAMUSCULAR; INTRAVENOUS at 08:03

## 2019-03-21 RX ADMIN — FENTANYL CITRATE 100 MCG: 50 INJECTION, SOLUTION INTRAMUSCULAR; INTRAVENOUS at 07:03

## 2019-03-21 RX ADMIN — SODIUM CHLORIDE: 0.9 INJECTION, SOLUTION INTRAVENOUS at 06:03

## 2019-03-21 RX ADMIN — KETOROLAC TROMETHAMINE 30 MG: 30 INJECTION, SOLUTION INTRAMUSCULAR; INTRAVENOUS at 10:03

## 2019-03-21 RX ADMIN — PHENYLEPHRINE HYDROCHLORIDE 100 MCG: 10 INJECTION INTRAVENOUS at 10:03

## 2019-03-21 RX ADMIN — PHENYLEPHRINE HYDROCHLORIDE 100 MCG: 10 INJECTION INTRAVENOUS at 08:03

## 2019-03-21 RX ADMIN — ONDANSETRON 4 MG: 2 INJECTION INTRAMUSCULAR; INTRAVENOUS at 12:03

## 2019-03-21 RX ADMIN — ACETAMINOPHEN 1000 MG: 10 INJECTION, SOLUTION INTRAVENOUS at 09:03

## 2019-03-21 RX ADMIN — GLYCOPYRROLATE 0.1 MG: 0.2 INJECTION, SOLUTION INTRAMUSCULAR; INTRAVENOUS at 10:03

## 2019-03-21 RX ADMIN — NEOSTIGMINE METHYLSULFATE 2.5 MG: 1 INJECTION INTRAVENOUS at 10:03

## 2019-03-21 RX ADMIN — FENTANYL CITRATE 25 MCG: 50 INJECTION, SOLUTION INTRAMUSCULAR; INTRAVENOUS at 08:03

## 2019-03-21 RX ADMIN — GLYCOPYRROLATE 0.3 MG: 0.2 INJECTION, SOLUTION INTRAMUSCULAR; INTRAVENOUS at 10:03

## 2019-03-21 RX ADMIN — CEFAZOLIN 2 G: 330 INJECTION, POWDER, FOR SOLUTION INTRAMUSCULAR; INTRAVENOUS at 07:03

## 2019-03-21 RX ADMIN — PHENYLEPHRINE HYDROCHLORIDE 100 MCG: 10 INJECTION INTRAVENOUS at 09:03

## 2019-03-21 RX ADMIN — FENTANYL CITRATE 25 MCG: 50 INJECTION, SOLUTION INTRAMUSCULAR; INTRAVENOUS at 10:03

## 2019-03-21 RX ADMIN — FENTANYL CITRATE 25 MCG: 50 INJECTION, SOLUTION INTRAMUSCULAR; INTRAVENOUS at 11:03

## 2019-03-21 RX ADMIN — DEXAMETHASONE SODIUM PHOSPHATE 4 MG: 4 INJECTION, SOLUTION INTRAMUSCULAR; INTRAVENOUS at 07:03

## 2019-03-21 RX ADMIN — PROPOFOL 150 MG: 10 INJECTION, EMULSION INTRAVENOUS at 07:03

## 2019-03-21 RX ADMIN — MIDAZOLAM HYDROCHLORIDE 2 MG: 1 INJECTION, SOLUTION INTRAMUSCULAR; INTRAVENOUS at 07:03

## 2019-03-21 RX ADMIN — PROPOFOL 50 MG: 10 INJECTION, EMULSION INTRAVENOUS at 07:03

## 2019-03-21 RX ADMIN — CEFAZOLIN 2 G: 330 INJECTION, POWDER, FOR SOLUTION INTRAMUSCULAR; INTRAVENOUS at 11:03

## 2019-03-21 RX ADMIN — SODIUM CHLORIDE, SODIUM GLUCONATE, SODIUM ACETATE, POTASSIUM CHLORIDE, MAGNESIUM CHLORIDE, SODIUM PHOSPHATE, DIBASIC, AND POTASSIUM PHOSPHATE: .53; .5; .37; .037; .03; .012; .00082 INJECTION, SOLUTION INTRAVENOUS at 08:03

## 2019-03-21 RX ADMIN — LIDOCAINE HYDROCHLORIDE 100 MG: 20 INJECTION, SOLUTION INTRAVENOUS at 07:03

## 2019-03-21 RX ADMIN — INDOCYANINE GREEN 10 MG: KIT INTRAVENOUS at 10:03

## 2019-03-21 RX ADMIN — LIDOCAINE HYDROCHLORIDE 10 MG: 10 INJECTION, SOLUTION EPIDURAL; INFILTRATION; INTRACAUDAL; PERINEURAL at 06:03

## 2019-03-21 NOTE — H&P
[facility]  History & Physical    Subjective:      Chief Complaint/Reason for Admission: so Armando is a 28 y.o. female.  Patient denies changes to her medical history since last being seen    Past Medical History:   Diagnosis Date    Abnormal Pap smear of cervix 2018    ASCUS , Negative HPV    Cancer     Hypertension      Past Surgical History:   Procedure Laterality Date     SECTION      LEEP (LOOP ELECTROSURGICAL EXCISION PROCEDURE) N/A 2019    Performed by Judith Mane MD at [facility] OR     Family History   Problem Relation Age of Onset    Hypertension Mother     No Known Problems Father     Heart disease Maternal Grandfather      Social History     Tobacco Use    Smoking status: Never Smoker    Smokeless tobacco: Never Used   Substance Use Topics    Alcohol use: No     Alcohol/week: 1.8 oz     Types: 3 Shots of liquor per week     Frequency: Monthly or less     Drinks per session: 3 or 4     Binge frequency: Never     Comment: occasionally    Drug use: No       Facility-Administered Medications Prior to Admission   Medication    naproxen sodium tablet 550 mg     PTA Medications   Medication Sig    amLODIPine (NORVASC) 5 MG tablet amlodipine 5 mg tablet   TAKE ONE TABLET BY MOUTH EVERY DAY    medroxyPROGESTERone (DEPO-PROVERA) 150 mg/mL injection Inject 150 mg into the muscle every 3 (three) months.    docusate sodium (COLACE) 100 MG capsule Take 1 capsule (100 mg total) by mouth 2 (two) times daily. Use while taking narcotics, hold for loose stools    HYDROcodone-acetaminophen (NORCO) 5-325 mg per tablet Take 1 tablet by mouth every 6 (six) hours as needed for Pain.    ondansetron (ZOFRAN-ODT) 4 MG TbDL Take 2 tablets (8 mg total) by mouth every 6 (six) hours as needed.    oxyCODONE (ROXICODONE) 5 MG immediate release tablet Take 1 tablet (5 mg total) by mouth every 4 (four) hours as needed for Pain (severe pain).      Review of patient's allergies indicates:  No Known Allergies     ROS    Objective:      Vital Signs (Most Recent)  Temp: 99.5 °F (37.5 °C) (03/21/19 0544)  Pulse: 79 (03/21/19 0544)  Resp: 18 (03/21/19 0544)  BP: (!) 143/85 (03/21/19 0544)  SpO2: 100 % (03/21/19 0544)    Vital Signs Range (Last 24H):  Temp:  [99.5 °F (37.5 °C)]   Pulse:  [79]   Resp:  [18]   BP: (143)/(85)   SpO2:  [100 %]     Physical Exam  Macromastia with grade 2 ptosis    Assessment:      Active Hospital Problems    Diagnosis  POA    Macromastia [N62]  Yes      Resolved Hospital Problems   No resolved problems to display.       Plan:    BBR today with Dr. Beebe

## 2019-03-21 NOTE — DISCHARGE SUMMARY
Pre-op Dx: macromastia  Post-Op Dx: same  Admit Date: 03/21/2019  Discharge day: 03/21/2019  Procedure performed during the hospital stay: bilateral breast reduction  Discharge Diet: resume home diet  Discharge medications: colace, zofran, oxycodone  Discharge Activity: light activity, avoid heavy lighting until cleared  Follow-up: 3/27/19  Disposition:stable in pacu, will return home today  Condition: good  Hospital course: Germaine under bilateral breast reduction under general anesthesia. Patient tolerated the procedure well.

## 2019-03-21 NOTE — DISCHARGE INSTRUCTIONS
AFTER VISIT INSTRUCTIONS      FOLLOW-UP:   We will see you 3/27 to check your surgical sites and drains.      CONTACT NUMBERS:    [facility]  [address] [place] [address]  [phone]    Plastic & Reconstructive Surgery  Microsurgery  [facility]  c/o Bill Beebe M.D.  [facility]  Second Floor [facility]  [address] [place] [address]]  Work [phone]  Toll free [phone]    To schedule appointment:  For all life-threatening emergencies, please call 911  For all other concerns regarding your plastic surgery, you may call the office at: [phone], toll free [phone].      BATHING  No tub soaking, lotions of creams to surgical sites until cleared by your doctor.  No scrubbing or soaking of incisions.  Pat wounds dry.  You can wear a pair of Tiago Gras beads around your neck while you shower and safety pin the VENKATA drain tabs to the beads to prevent them from dangling.    WOUND CARE  Record the drain outputs as instructed.Use your drain log to record the output from your drain, which you can use to keep track of each of your drains.   There are further instructions for drain care at the bottom of this page.      Drainage Tube   Your doctor discharges you with a Adrian-Catalan drainage tube. These tubes are in place to help prevent fluid from collecting around your prosthesis.  It is important that fluid does not stay in the cavity, because it can cause healing difficulties or implant infection.  It helps drain and collect blood and body fluid after surgery. This can prevent swelling and reduces the risk for infection. The tube is held in place by a few stitches.     Drain Care:  · Dont sleep on the same side as the tube.  · Secure the tube and bag inside your clothing with a safety pin. This helps keep the tube from being pulled out.  · Empty your drain at least three times a day.  Regularly strip the tubing from your drain stitch to  the bulb to prevent clots from accumulating.  Empty it when you notice it is half full with fluid.  When it gets beyond half way full, the suction mechanism does not work as well and the fluid collections in your wounds.  Wash and dry your hands before emptying the drain.  How to use the VENKATA bulb:  ? Lift the opening on the drain.  ? Drain the fluid into a measuring cup.  ? Record the amount of fluid each time you empty the drain. Include the date and time it was emptied. Share this information with your doctor on your next visit.  ? Squeeze the bulb with your hands until you hear air coming out of the bulb if your doctor has instructed you to do so (sometimes the bulb is used as a reservoir without suction). Check with your doctor about specific drain instructions.  ? Close the opening.    · Do not change the dressing around the tube unless the clear tape becomes soiled.   ? Wash your hands.  ? Remove the old bandage.  ? Wash your hands again.  ? Wet a cotton swab and clean the skin around the incision and tube site. Use normal saline solution (salt and water). Or, you can use warm, soapy water.  ? Put a new bandage on the incision and tube site. Make the bandage large enough to cover the whole incision area.  ? Tape the bandage in place.    · Keep the bandage and tube site dry when you shower. Ask your healthcare provider about the best way to do this.  ? Stripping the tube helps keep blood clots from blocking the tube.   ? Hold the tubing where it leaves the skin, with one hand. This keeps it from pulling on the skin.  ? Pinch the tubing with the thumb and first finger of your other hand.  ? Slowly and firmly pull your thumb and first finger down the tubing. You may find it helpful to hold an alcohol swab between your fingers and the tube to lubricate the tubing.  ? If the pulling hurts or feels like its coming out of the skin, stop. Begin again more gently.    SUTURES  Do not remove any sutures.  These will be  removed in the office.    ACTIVITY  Encourage po intake  You may resume light activity (walking, usual activities around the home).  Avoid heavy lifting, running, swimming, strenuous activity for at least 3 weeks.    Avoid long-distance travel for at least 3 weeks.  If you have long car rides, hydrate yourself well.  You can wear compressive stockings to avoid the blood in your legs from sitting still.  Perform ankle circles while awake to prevent stasis in your legs.      MEDICATIONS  stacy pain medication as needed:  Tylenol (acetominophen) 650 mg every 6 hours is recommended for mild pain.  If you are taking a narcotic mixed with acetaminophen, wait at least 4 HOURS after taking other acetaminophen-containing preparations (ie. Tylenol)  DO NOT exceed 4 grams of Tylenol in a 24 h period  Continue your usual medications.  Ask Dr. Beebe whether your multivitamins or supplements can be continued, if you haven't already discussed that.        SCAR MANAGEMENT  Scars may take over 1 year to mature.  Some scars will remain pink, dark purple, and possibly raised for 6-9 months after surgery.  After one year, scars often become flatter, smoother, and may change color.  After removal of the tegaderm/tape, suture removal, or when glue was used, apply a thin layer of Aquaphor (available at any drugstore) or antibiotic ointment to the scars for another 2 weeks.    Begin silicone when scars smooth, generally starting about 6 weeks after surgery.  Please discuss this with Dr. Beebe before proceeding.  Medical grade silicone gel is available on companies' web sites or on amazon.com  Brands recommended:  - Scarfade (scarfade.com)  - NeuGel ( )  - Kelocote (kelocote.com)    THINGS TO WATCH FOR:  If you notice new pain, redness, abnormal drainage, abnormal fluid collection, fever, or wound healing issues at the surgical site please notify Dr. Beebe's office immediately.  If there are issues with your surgical sites, we would rather  know about them early, so that an appropriate plan of action can be followed.  If notified in a timely manner, this kind of post op care should be coordinated by Dr. Beebe rather than a surgeon or emergency person you are not familiar with.    If you are short of breath or have new leg pain and/or having difficulty breathing, please go to your nearest emergency room.      When to seek medical care  Call your healthcare provider right away if you have any of the following:  · New or increased pain around the tube  · Redness, swelling, or warmth around the incision or tube  · Drainage that is foul-smelling  · Vomiting  · Fever of 100.4°F (38°C)  · Fluid leaking around the tube  · Incision seems not to be healing  · Stitches become loose  · Tube falls out or breaks  · Drainage that changes from light pink to dark red  · Blood clots in the drainage bulb  · A sudden increase or decrease in the amount of drainage (over 30 mL)     Germaine's Drain Record: RIGHT BREAST     Date Emptied Time Emptied Amount in [place]                                                                                                                   Germaine's Drain Record: LEFT BREAST     Date Emptied Time Emptied Amount in [facility]                                                                                                                       Discharge Instructions for Breast Reduction  You had a procedure called breast reduction, or reduction mammoplasty. Breast reduction is a surgical procedure to decrease the size of a womans breast. Women choose breast reduction to relieve back pain, to decrease the size of the breasts for appearance, or to balance a difference in breast size.  Home care  · Take your medication exactly as directed.  · Keep an ice pack on your chest to relieve discomfort and to avoid extra swelling. Put the ice pack on for 20 minutes. Then leave it off for 20 minutes. Repeat as often as necessary.  · Wear the  special bra or bandage you were given before discharge as directed by your health care provider. Expect to wear the bra or wrap 24 hours a day for about 3 to 4 weeks. You may remove it when you shower, starting 3 days after your surgery.  · Shower as necessary, starting 3 days after surgery. Gently wash your incision site. Pat the incision dry. Dont apply lotions, oils, or creams.  · Do not submerge your incision in a tub bath until it is completely closed. Doing so may introduce bacteria and cause an infection.  · You will have a dressing over your incisions. Be sure to ask your healthcare provider how to care for your dressing. Your stitches may dissolve on their own. Or, they may be removed at a follow-up appointment. If you have small, white adhesive strips at your incision sites, do not remove them. They will come off on their own.  · Make an appointment to have your stitches or staples removed in 7 to 10 days.  Activity  · Dont raise your arms above breast level until your health care provider says its OK.  · Dont lift, push, or pull anything heavier than 10 pounds for at least 5 to 7 days.  · Sleep on your back. Use pillows to keep the upper part of your body elevated.  · Dont drive until your healthcare provider says its OK.  Follow-up  Make a follow-up appointment as directed by our staff.  When to call your healthcare provider  Call your healthcare provider right away if you have any of the following:  · Trouble breathing, sudden shortness of breath or gradual shortness of breath that gets worse  · Bleeding or drainage through the special bra or bandage  · Pain that is not relieved by medication  · More soreness, swelling, or bruising on 1 breast than the other  · Redness in the breasts or warmth to the touch  · Any rapid swelling in 1 area or breast  · Sudden chest pain  · Fever of 100.4°F (38°C) or higher, or chills  · Increasing pain with or without activity   Date Last Reviewed: 4/20/2015  ©  3860-[address] [employer]. [address] [place] [address], [place] [address]. All rights reserved. This information is not intended as a substitute for professional medical care. Always follow your healthcare professional's instructions.

## 2019-03-21 NOTE — OP NOTE
[facility]  Plastic Surgery  Operative Note    SUMMARY     Date of Procedure: 3/21/2019     Procedure: Procedure(s) (LRB):  MAMMOPLASTY, REDUCTION, BILATERAL (Bilateral)     Surgeon(s) and Role:     * Bill Beebe MD - Primary     * Joe Viera MD - Resident - Assisting    Assistant:  Lou OWEN    Pre-Operative Diagnosis: Chronic neck and back pain [M54.2, M54.9, G89.29]  Macromastia [N62]    Post-Operative Diagnosis: * No post-op diagnosis entered *    Procedure:  1.  Bilateral breast reduction  2.  SPY exam    Anesthesia: General    Complications: No    Estimated Blood Loss (EBL): 30 mL           Implants: * No implants in log *    Specimens:   Specimen (12h ago, onward)    Start     Ordered    03/21/19 1107  Specimen to Pathology - Surgery  Once     Comments:  1.) Left Breast - Permanent.2.) Right Breast - Permanent.     Start Status   03/21/19 1107 In process       03/21/19 1153         Indications:  28 year old female presents for bilateral breast reduction.  Her left breast was larger than her right and she was approved for a 550 g reduction.  Risks, benefits, and alternatives were discussed.  All of her questions were answered.  She was marked in the pre-operative holding area.  7 cm wise limbs were marked adjacent to the area of anticipated nipple areola inset.  A superomedial pedicle was also marked bilaterally.     Procedure:  The patient was transported to the OR and placed in a supine position, where general anesthesia was induced.  An appropriate time out was performed to verify the operative site.  Appropriate antibiotics were dosed and serial compression boots were on the patient and running before induction.  The procedure was confirmed.      Beginning with the right side, a 42 mm cookie cutter was used to yash out the periareolar portion of the incision.  All markings were scored, including creation of 2 cm isosceles limbs along her inframammary fold for  distribution of forces at the future T junction of the closure.  The superomedial pedicle was de-epithelialized, leaving general dermis on the pedicle.  Skin and breast tissue were removed using a combination of scalpel and bovie cautery within the wise pattern, taking care not to undermine the superomedial pedicle.  This specimen was sent for permanent pathology and weighed.  Hemostasis was meticulously obtained and the closure was tailor tacked to remove dog ears.    A similar procedure was performed on the larger left side.  A 42 mm cookie cutter was used to yash out the periareolar portion of the incision.  All markings were scored, including creation of 2 cm isosceles limbs along her inframammary fold for distribution of forces at the future T junction of the closure.  The superomedial pedicle was de-epithelialized, leaving general dermis on the pedicle.  Skin and breast tissue were removed using a combination of scalpel and bovie cautery within the wise pattern, taking care not to undermine the superomedial pedicle.  This specimen was sent for permanent pathology and weighed.  Hemostasis was meticulously obtained and the closure was tailor tacked to remove dog ears.    At this point, volume symmetry was confirmed between sides.  The patient was reflexed and the nisha-areola incisions were marked using a cookie cutter.  Skeletal landmarks were used to confirm symmetry between sides.      Final resection volumes will be listed in the permanent pathology report.  Right side resection volume was over 550 g.  Left sided resection volume was approximately 750 g.      The patient was then placed in the supine position.  Exparelle 133 mg was injected in a field block fashion subfascially. The wounds were copiously irrigated.  Meticulous hemostasis was confirmed on both sides with bovie cautery and suture ligature.  15 Kazakh channel drains were placed using a trochar, exiting inferolateral to the IM fold bilaterally.   Drains were secured with 3-0 silk and placed to bulb suction.      The neoareola incision was completed by de-epithelializing the medial potion over the pedicle, taking care to preserve the subdermal plexus.  The nipple areola complexes were brought through this incision and tacked into position.  Arterial and venous phase SPY imaging was performed by injecting 4 cc of ICG intravenously followed by an IV flush.  An appropriate amount of time passed to confirm that there was adequate drainage of dye from the NAC during the venous phase.      The T junction was closed with three interrupted 2-0 PDS deep dermal sutures with good approximation.  3-0 monocryls were used to inset the areola and the remaining incisions with good approximation.  A running 4-0 monocryl was used to close the subcuticular layer around the areola.  The remaining portions of the wise pattern were closed with running 3-0 monocryl.  All drains were confirmed to be to bulb suction. 5-0 fast gut suture was used to even any gaps or step offs along the closure.  A prineo dressing was placed along all but the nola-areolar incisions.  An ABD pad and surgical bra were placed for external compression.    At this point, the procedure was complete.  All needle and sponge counts were correct.    The patient was transferred to the recovery area with the plan of discharging her home.  Post-operatively her drain check was uneventful and she had viable areolae.               Condition: Stable    Disposition: PACU - hemodynamically stable.    Attestation: I was present and scrubbed for the entire procedure.

## 2019-03-21 NOTE — PLAN OF CARE
Patient tolerated procedure/anesthesia well, vss, no distress noted or reported. Denies pain, denies nausea, tolerates PO. Discharge instructions reviewed with patient and family, verbalized understanding. Consents with chart. Dr. Beebe at bedside discussing procedure and reviewing discharge instructions. Questions answered.

## 2019-03-22 ENCOUNTER — TELEPHONE (OUTPATIENT)
Dept: PLASTIC SURGERY | Facility: CLINIC | Age: 29
End: 2019-03-22

## 2019-03-22 NOTE — TELEPHONE ENCOUNTER
Called pt to get drain outputs D1 3/21AM: 15CC D2 3/21 PM:15 CC D1 3/21 PM :10CC 3/21 PM 9CC , 3/22 D1 AM 10CC , D2 3/22 AM 10CC    Pt says she has little pain but is doing fine.               ----- Message from Lou Hanna PA-C sent at 3/22/2019  1:13 PM CDT -----  Please call to check on patient and go over drain logs

## 2019-03-22 NOTE — ANESTHESIA POSTPROCEDURE EVALUATION
"Anesthesia Post Evaluation    Patient: Germaine Armando    Procedure(s) Performed: Procedure(s) (LRB):  MAMMOPLASTY, REDUCTION, BILATERAL (Bilateral)    Final Anesthesia Type: general  Patient location during evaluation: [facility]  Patient participation: Yes- Able to Participate  Level of consciousness: awake and alert  Post-procedure vital signs: reviewed and stable  Pain management: adequate  Airway patency: patent  PONV status at discharge: No PONV  Anesthetic complications: no      Cardiovascular status: blood pressure returned to baseline  Respiratory status: unassisted  Hydration status: euvolemic  Follow-up not needed.        Visit Vitals  /73 (BP Location: Right arm, Patient Position: Lying)   Pulse 73   Temp 36.4 °C (97.6 °F) (Tympanic)   Resp 18   Ht 5' 4" (1.626 m)   Wt 81.6 kg (180 lb)   SpO2 100%   Breastfeeding? No   BMI 30.90 kg/m²       Pain/Erika Score: Pain Rating Prior to Med Admin: 7 (3/21/2019 12:45 PM)  Erika Score: 10 (3/21/2019  3:02 PM)        "

## 2019-03-24 ENCOUNTER — TELEPHONE (OUTPATIENT)
Dept: SURGERY | Facility: CLINIC | Age: 29
End: 2019-03-24

## 2019-03-25 ENCOUNTER — TELEPHONE (OUTPATIENT)
Dept: SURGERY | Facility: CLINIC | Age: 29
End: 2019-03-25

## 2019-03-25 ENCOUNTER — TELEPHONE (OUTPATIENT)
Dept: PLASTIC SURGERY | Facility: CLINIC | Age: 29
End: 2019-03-25

## 2019-03-25 RX ORDER — POLYETHYLENE GLYCOL 3350 17 G/17G
17 POWDER, FOR SOLUTION ORAL DAILY
Qty: 10 PACKET | Refills: 0 | Status: SHIPPED | OUTPATIENT
Start: 2019-03-25 | End: 2020-03-13

## 2019-03-25 NOTE — TELEPHONE ENCOUNTER
Spoke with Germaine for post op check.  Has sensation in nipples.  Drain output is serous on right, serosanguinous on left.  Output on Sunday was less than 10 cc per day.  She will send drain log to office.  She is inquiring to schedule an earlier follow up.  Office will coordinate, otherwise she will be seen as scheduled on Wed.    Plastic & Reconstructive Surgery  Microsurgery  [facility]  c/o Bill Beebe M.D.  [facility]  Second Floor [address]  [address] [place] [address]    Work [phone]  Toll free [phone]  If no answer [phone]

## 2019-03-25 NOTE — TELEPHONE ENCOUNTER
Drain output reviewed.  Drains less than 30 cc per day over last day only.  Previously output was higher.  Will keep follow up on Wed.  Miralax ordered.  Instructed patient to take docusate.  Can fill miralax if needed.

## 2019-03-25 NOTE — TELEPHONE ENCOUNTER
called to check on pt progress since Sx and to get pt drain outputs pt stated she was doing well . Drain Outputs 3/23 am D1 :10cc D2 :9cc  3/24 D1 am :6 cc D2:9 cc 3/25 am D1 : 6 cc D2 : 5cc.

## 2019-03-26 ENCOUNTER — TELEPHONE (OUTPATIENT)
Dept: PLASTIC SURGERY | Facility: CLINIC | Age: 29
End: 2019-03-26

## 2019-03-26 NOTE — TELEPHONE ENCOUNTER
called pt and confirmed her appt on 3/27. Pt verbalized understanding of time date and location of appt.

## 2019-03-27 ENCOUNTER — TELEPHONE (OUTPATIENT)
Dept: PLASTIC SURGERY | Facility: CLINIC | Age: 29
End: 2019-03-27

## 2019-03-27 ENCOUNTER — OFFICE VISIT (OUTPATIENT)
Dept: PLASTIC SURGERY | Facility: CLINIC | Age: 29
End: 2019-03-27
Payer: COMMERCIAL

## 2019-03-27 VITALS
BODY MASS INDEX: 30.73 KG/M2 | SYSTOLIC BLOOD PRESSURE: 134 MMHG | HEART RATE: 76 BPM | DIASTOLIC BLOOD PRESSURE: 84 MMHG | WEIGHT: 179 LBS

## 2019-03-27 DIAGNOSIS — Z09 POSTOPERATIVE EXAMINATION: Primary | ICD-10-CM

## 2019-03-27 PROCEDURE — 99999 PR PBB SHADOW E&M-EST. PATIENT-LVL II: CPT | Mod: PBBFAC,,, | Performed by: PHYSICIAN ASSISTANT

## 2019-03-27 PROCEDURE — 99024 POSTOP FOLLOW-UP VISIT: CPT | Mod: S$GLB,,, | Performed by: PHYSICIAN ASSISTANT

## 2019-03-27 PROCEDURE — 99024 PR POST-OP FOLLOW-UP VISIT: ICD-10-PCS | Mod: S$GLB,,, | Performed by: PHYSICIAN ASSISTANT

## 2019-03-27 PROCEDURE — 99999 PR PBB SHADOW E&M-EST. PATIENT-LVL II: ICD-10-PCS | Mod: PBBFAC,,, | Performed by: PHYSICIAN ASSISTANT

## 2019-03-27 NOTE — TELEPHONE ENCOUNTER
spoke to pt and let her know she could drive as long as she wasn;t taking  anymore pain pills, I also scheduled pt for a post op in 2 weeks on 04/08/19 @11am. Pt verbalized understanding of all things discussed during phone call.             ----- Message from Lou Hanna PA-C sent at 3/27/2019  2:10 PM CDT -----  Please let patient know she can drive now that drive now that drains are out and she stopped taking pain meds.      Make followup for two weeks

## 2019-03-28 NOTE — PROGRESS NOTES
Subjective:    Germaine presents to Plastic Surgery Clinic on 3/28/2019 for a follow up visit status post bilateral breast reduction. She is doing well today with no issues since discharge. Patient reports she has sensation to both nipples. Patient is pleased with out come and size. Patient reports ~10cc of output from b/l VENKATA drains. Managing pain without narcotic medication. She denies fever, chills, nausea, vomiting or other systemic signs of infection.     HPI    Past Medical History:   Diagnosis Date    Abnormal Pap smear of cervix 2018    ASCUS , Negative HPV    Cancer     Hypertension        Past Surgical History:   Procedure Laterality Date     SECTION      LEEP (LOOP ELECTROSURGICAL EXCISION PROCEDURE) N/A 2019    [facility]    MAMMOPLASTY, REDUCTION, BILATERAL Bilateral 3/21/2019    Performed by Bill Beebe MD at [facility] OR [facility] FLR     Objective:    /84   Pulse 76   Wt 81.2 kg (179 lb 0.2 oz)   BMI 30.73 kg/m²   Physical Exam   WD WN NAD  VSS  Normal resp effort  R breast - incision CDI, no erythema or drainage, nipple viable   L breast -  incision CDI, no erythema or drainage, nipple viable     Assessment/Plan:   28 y.o. female s/p bilateral breast reduction   - B/l VENKATA drains removed in clinic.   - Continue with activity restrictions. Refrain from heavy lifting.   - Continue to wear surgical bra as directed  - follow up in 2 weeks.

## 2019-04-05 ENCOUNTER — TELEPHONE (OUTPATIENT)
Dept: PLASTIC SURGERY | Facility: CLINIC | Age: 29
End: 2019-04-05

## 2019-04-08 ENCOUNTER — OFFICE VISIT (OUTPATIENT)
Dept: PLASTIC SURGERY | Facility: CLINIC | Age: 29
End: 2019-04-08
Payer: COMMERCIAL

## 2019-04-08 DIAGNOSIS — Z09 POSTOPERATIVE EXAMINATION: Primary | ICD-10-CM

## 2019-04-08 PROCEDURE — 99999 PR PBB SHADOW E&M-EST. PATIENT-LVL II: ICD-10-PCS | Mod: PBBFAC,,, | Performed by: SURGERY

## 2019-04-08 PROCEDURE — 99024 POSTOP FOLLOW-UP VISIT: CPT | Mod: S$GLB,,, | Performed by: SURGERY

## 2019-04-08 PROCEDURE — 99024 PR POST-OP FOLLOW-UP VISIT: ICD-10-PCS | Mod: S$GLB,,, | Performed by: SURGERY

## 2019-04-08 PROCEDURE — 99999 PR PBB SHADOW E&M-EST. PATIENT-LVL II: CPT | Mod: PBBFAC,,, | Performed by: SURGERY

## 2019-04-08 NOTE — PROGRESS NOTES
Plastic Update  Date of Surgery:  3/21/19     Subjective:  Denies drainage from incision in last few days  Pain is well controlled with OTC medications.  Patient is not taking oxycodone.   Patient is ambulating without difficulty  Denies fever  Denies calf pain or shortness of breath     Objective:  WD WN NAD  VSS  Normal resp effort  R breast - incision CDI, no erythema or drainage, nipple viable   L breast -  incision CDI, no erythema or drainage, nipple viable    Good symmetry between sides.  Incision line in tact. No evidence of fluid collection or infection bilaterally    Assessment:  1.   18 days after bilateral breast reduction, expected healing. Patient pleased with outcome and size.   2.  No evidence of fluid collection or abnormal bleeding     Plan:  - Continue surgical bra.  No underwires recommended.  Can switch to sports bra.  Instructed her to use one with clasp or zipper in front.  - OK to shower.   - Ambulate often  - Advised her to continue no heavy lifting restriction  - Encourage iron rich foods (red meat, beets) and/or multivitamin with iron  - Follow up in 2 weeks    Plastic & Reconstructive Surgery  Microsurgery  [facility]  c/o Bill Beebe M.D.  [facility] Surgery Clinic  [address] Floor [address]  [address] [place] [address]    Work [phone]  Toll free [phone]  If no answer [phone]

## 2019-04-08 NOTE — LETTER
April 8, 2019      Germaine [address]  [place] [address]             Efrain Count includes the [facility] - Plastic S[facility] [place]  [address] [facility] [address]  Phone: [phone]  Fax: [phone] Patient: Germaine Armando  MRN: [id number]  YOB: 1990  Date of Visit: 04/08/2019    To Whom It May Concern:    Germaine Reeves was seen in the Plastic Surgery Clinic on   4/8/19. She had a procedure on 3/21/19. I advised that she take at least 2 weeks off from work while she recovers.  I also advised that do only light duty activity (no lifting objects heavier than 5 lbs) for at least 6 weeks post operatively.  If you have any questions or concerns, or if I can be of further assistance, please do not hesitate to contact my office.    Sincerely,          Bill Beebe MD  Section of Plastic & Reconstructive Surgery  [facility]

## 2019-04-22 ENCOUNTER — TELEPHONE (OUTPATIENT)
Dept: PLASTIC SURGERY | Facility: CLINIC | Age: 29
End: 2019-04-22

## 2019-04-23 ENCOUNTER — TELEPHONE (OUTPATIENT)
Dept: PLASTIC SURGERY | Facility: CLINIC | Age: 29
End: 2019-04-23

## 2019-04-23 NOTE — TELEPHONE ENCOUNTER
Called pt and pt mother pt phone didnt have vm set up and pt mother did , Left mother a vm to have pt call me back at the office and that I was chechking to make sure pt was ok , she missed her scheduled appt.

## 2019-04-25 ENCOUNTER — TELEPHONE (OUTPATIENT)
Dept: PLASTIC SURGERY | Facility: CLINIC | Age: 29
End: 2019-04-25

## 2019-05-15 ENCOUNTER — TELEPHONE (OUTPATIENT)
Dept: PLASTIC SURGERY | Facility: CLINIC | Age: 29
End: 2019-05-15

## 2019-05-15 NOTE — TELEPHONE ENCOUNTER
left message for pt to call us back she didnt show up to lat appt . I was calling to make sure things were ok

## 2019-05-22 ENCOUNTER — TELEPHONE (OUTPATIENT)
Dept: PLASTIC SURGERY | Facility: CLINIC | Age: 29
End: 2019-05-22

## 2019-05-22 NOTE — TELEPHONE ENCOUNTER
called pt to check on her . Pt didnt answer , I left vm for patient to call us back and update us on how she was doing.

## 2019-05-24 ENCOUNTER — PATIENT MESSAGE (OUTPATIENT)
Dept: ADMINISTRATIVE | Facility: OTHER | Age: 29
End: 2019-05-24

## 2020-03-13 ENCOUNTER — HOSPITAL ENCOUNTER (EMERGENCY)
Facility: HOSPITAL | Age: 30
Discharge: HOME | End: 2020-03-13
Attending: EMERGENCY MEDICINE
Payer: COMMERCIAL

## 2020-03-13 VITALS
SYSTOLIC BLOOD PRESSURE: 151 MMHG | HEIGHT: 64 IN | WEIGHT: 180 LBS | HEART RATE: 76 BPM | DIASTOLIC BLOOD PRESSURE: 98 MMHG | BODY MASS INDEX: 30.73 KG/M2 | OXYGEN SATURATION: 100 % | RESPIRATION RATE: 20 BRPM | TEMPERATURE: 99 F

## 2020-03-13 DIAGNOSIS — B34.9 VIRAL SYNDROME: Primary | ICD-10-CM

## 2020-03-13 LAB
GROUP A STREP, MOLECULAR: NEGATIVE
INFLUENZA A, MOLECULAR: NEGATIVE
INFLUENZA B, MOLECULAR: NEGATIVE
SPECIMEN SOURCE: NORMAL

## 2020-03-13 PROCEDURE — 25000003 PHARM REV CODE 250: Mod: ER | Performed by: EMERGENCY MEDICINE

## 2020-03-13 PROCEDURE — 87502 INFLUENZA DNA AMP PROBE: CPT | Mod: ER

## 2020-03-13 PROCEDURE — 99284 EMERGENCY DEPT VISIT MOD MDM: CPT | Mod: ER

## 2020-03-13 PROCEDURE — 87651 STREP A DNA AMP PROBE: CPT | Mod: ER

## 2020-03-13 PROCEDURE — U0002 COVID-19 LAB TEST NON-CDC: HCPCS

## 2020-03-13 RX ORDER — BENZONATATE 100 MG/1
100 CAPSULE ORAL 3 TIMES DAILY PRN
Qty: 9 CAPSULE | Refills: 0 | Status: SHIPPED | OUTPATIENT
Start: 2020-03-13 | End: 2020-03-16

## 2020-03-13 RX ORDER — KETOROLAC TROMETHAMINE 10 MG/1
10 TABLET, FILM COATED ORAL EVERY 6 HOURS
Qty: 5 TABLET | Refills: 0 | Status: SHIPPED | OUTPATIENT
Start: 2020-03-13 | End: 2020-08-18

## 2020-03-13 RX ORDER — KETOROLAC TROMETHAMINE 10 MG/1
10 TABLET, FILM COATED ORAL
Status: COMPLETED | OUTPATIENT
Start: 2020-03-13 | End: 2020-03-13

## 2020-03-13 RX ADMIN — KETOROLAC TROMETHAMINE 10 MG: 10 TABLET, FILM COATED ORAL at 07:03

## 2020-03-13 NOTE — ED PROVIDER NOTES
"Chief Complaint  Chief Complaint   Patient presents with    Cough     30 y/o f to er with c/o "cough, chills, and chest pain." Pt reports "I just came from work and they told me I had a fever and I needed to leave." Pt reports symptoms started last night.        HPI  Germaine Armando is a 29 y.o. female who presents with fever and cough.  Patient reports some mild chest discomfort with cough only.  She reports she went to work and was told she had fever and was asked to go home after testing.  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  She denies vomiting or diarrhea.  No lethargy.  No decreased appetite.  Patient denies sore throat.  Pain is mild    Past medical history  Past Medical History:   Diagnosis Date    Abnormal Pap smear of cervix 2018    ASCUS , Negative HPV    Cancer     Hypertension        Current Medications  No current facility-administered medications for this encounter.     Current Outpatient Medications:     benzonatate (TESSALON) 100 MG capsule, Take 1 capsule (100 mg total) by mouth 3 (three) times daily as needed for Cough., Disp: 9 capsule, Rfl: 0    ketorolac (TORADOL) 10 mg tablet, Take 1 tablet (10 mg total) by mouth every 6 (six) hours., Disp: 5 tablet, Rfl: 0    medroxyPROGESTERone (DEPO-PROVERA) 150 mg/mL injection, Inject 150 mg into the muscle every 3 (three) months., Disp: , Rfl:     Allergies  Review of patient's allergies indicates:  No Known Allergies    Surgical history  Past Surgical History:   Procedure Laterality Date     SECTION      LOOP ELECTROSURGICAL EXCISION PROCEDURE (LEEP) N/A 2019    Procedure: LEEP (LOOP ELECTROSURGICAL EXCISION PROCEDURE);  Surgeon: Judith Mane MD;  Location: [facility];  Service: OB/GYN;  Laterality: N/A;    REDUCTION OF BOTH BREASTS Bilateral 3/21/2019    Procedure: MAMMOPLASTY, REDUCTION, BILATERAL;  Surgeon: Bill Beebe MD;  " [facility];  Service: Plastics;  Laterality: Bilateral;  Dx. Code--- N62, M54.2, G89.29, M54.9, R21   Procedure--- 62732 Hemant Breast Reduction        Social history  Social History     Socioeconomic History    Marital status: Single     Spouse name: Not on file    Number of children: Not on file    Years of education: Not on file    Highest education level: Not on file   Occupational History    Not on file   Social Needs    Financial resource strain: Not on file    Food insecurity:     Worry: Not on file     Inability: Not on file    Transportation needs:     Medical: Not on file     Non-medical: Not on file   Tobacco Use    Smoking status: Never Smoker    Smokeless tobacco: Never Used   Substance and Sexual Activity    Alcohol use: No     Alcohol/week: 0.0 standard drinks     Frequency: Monthly or less     Drinks per session: 3 or 4     Binge frequency: Never     Comment: occasionally    Drug use: No    Sexual activity: Yes     Partners: Male     Birth control/protection: Injection   Lifestyle    Physical activity:     Days per week: Not on file     Minutes per session: Not on file    Stress: Not on file   Relationships    Social connections:     Talks on phone: Not on file     Gets together: Not on file     Attends Hindu service: Not on file     Active member of club or organization: Not on file     Attends meetings of clubs or organizations: Not on file     Relationship status: Not on file   Other Topics Concern    Not on file   Social History Narrative    Not on file       Family History  Family History   Problem Relation Age of Onset    Hypertension Mother     No Known Problems Father     Heart disease Maternal Grandfather        Review of systems  : No dysuria or discharge.  Musculoskeletal: No injury; full range of motion.  Skin: No rash, abscess, or laceration.  Neurologic: No new focal weakness or sensory changes.  All systems otherwise negative except as noted in  "ROS and HPI    Physical Exam  Vital signs: BP (!) 166/99 (BP Location: Left arm, Patient Position: Sitting)   Pulse 88   Temp (!) 100.8 °F (38.2 °C) (Oral)   Resp 20   Ht 5' 4" (1.626 m)   Wt 81.6 kg (180 lb)   SpO2 100%   BMI 30.90 kg/m²   Constitutional: No acute distress.  Well developed, alert, oriented and appropriate.  HENT: Normocephalic, atraumatic. Normal ear, nose, and throat.  Eyes: PERRL, EOMI, normal conjunctiva.  Neck: Normal range of motion, no tenderness; supple.  Respiratory: Nonlabored breathing with normal breath sounds.  Cardiovascular: RRR with no pulse deficit.  GI: Soft, nontender, no rebound or guarding.  Musculoskeletal: Normal ROM, no tenderness, injury, or edema.  Skin: Warm, dry skin without infection or injury.  Neurologic: Normal motor, sensation with no new focal deficit.  Psychiatric: Affect normal, judgement normal, mood normal.  No SI, HI, and not gravely disabled.    Labs  Pertinent labs reviewed (see chart for details)  Labs Reviewed   INFLUENZA A & B BY MOLECULAR   GROUP A STREP, MOLECULAR       ECG  No results found for this or any previous visit.  ECG interpreted by ED MD    Radiology  No orders to display       Procedures  Procedures    Medications   ketorolac tablet 10 mg (10 mg Oral Given 3/13/20 0712)       ED course and medical decision making         Normal examination.  Negative influenza and rapid strep.  No other obvious etiology.  Lungs clear to auscultation.  No pneumonia.  Patient does have potential contacts with corona virus patient's so we will test cautiously.  She will self quarantine until testing results.    Disposition    Patient discharged in stable condition      Final impression  1. Viral syndrome        Critical care time spent with this patient was 0 minutes excluding the procedure time.          Jose Angel Garcia MD  03/13/20 0746    "

## 2020-03-13 NOTE — ED NOTES
Pt in negative pressure room and appropriate signs placed on outside the door. ( stop do not enter, droplet and airborne precautions)

## 2020-03-13 NOTE — DISCHARGE INSTRUCTIONS
Please go directly home and self quarantine until contacted with test results.  We expect this to take approximately 3-5 days.  You have been tested for corona virus but we are waiting on results.  If worsening shortness of breath or weakness, please contact your healthcare provider or return to the emergency department.  Please call if possible to the emergency department before arrival.  Please return to the ED immediately if symptoms return, change or worsen in any way.  Please call your primary doctor today for reevaluation appointment.

## 2020-03-13 NOTE — ED NOTES
Pt ambulatory to  3.  [place] pt to , call bell, and routine.  Call bell in reach and bed low and locked.  Adult Physical Assessment   LOC: 29 y.o. female verified via two identifiers.  The patient is awake, alert, oriented and speaking appropriately at this time.  (ENT: Pt with hoarse voice. Pink MMM 2+, Nasal congestion clear.)  APPEARANCE: Patient appears to be in no acute distress at this time. Patient is clean and well groomed.  SKIN:The skin is hot and dry, color consistent with ethnicity, patient has normal skin turgor and moist mucus membranes, skin intact, no breakdown or brusing noted.  MUSCULOSKELETAL: Patient moving all extremities well, no obvious swelling or deformities noted.   RESPIRATORY: Airway is open and patent, respirations are spontaneous, patient has a normal effort and rate, no accessory muscle use noted. Visible chest rise noted. (Pt with congested cough, denies being productive.)  CARDIAC: Patient has a normal rate and rhythm, no periphreal edema noted in any extremity, capillary refill < 3 seconds in all extremities  ABDOMEN: Soft and non tender to palpation, no abdominal distention noted. (Pt denies any nausea, vomiting, or diarrhea.)  NEUROLOGIC: Eyes open spontaneously, behavior appropriate to situation, follows commands,     Instruct to call with problems, needs, or concerns  Pt verbalized understanding.  Will continue to monitor

## 2020-03-18 DIAGNOSIS — R50.9 FEVER, UNSPECIFIED: Primary | ICD-10-CM

## 2020-03-24 NOTE — PROGRESS NOTES
This result was communicated to the patient by Nataly Anderson PA-C on 03/24/2020.    Your test was NEGATIVE for COVID-19 (coronavirus).  If you still have symptoms, treat with rest, fluids, and over-the-counter medications.  Continue to stay home, avoid large crowds, and practice proper handwashing.     If your symptoms worsen or if you have any other concerns, please contact [facility] at [phone].     Sincerely,    Nataly Anderson PA-C

## 2020-03-25 ENCOUNTER — TELEPHONE (OUTPATIENT)
Dept: INTERNAL MEDICINE | Facility: CLINIC | Age: 30
End: 2020-03-25

## 2020-03-25 ENCOUNTER — PATIENT MESSAGE (OUTPATIENT)
Dept: INTERNAL MEDICINE | Facility: CLINIC | Age: 30
End: 2020-03-25

## 2020-03-25 DIAGNOSIS — U07.1 DISEASE DUE TO 2019 NOVEL CORONAVIRUS: Primary | ICD-10-CM

## 2020-03-25 LAB — SARS-COV-2 RNA RESP QL NAA+PROBE: DETECTED

## 2020-03-25 NOTE — TELEPHONE ENCOUNTER
----- Message from Eduar Guillen sent at 3/25/2020  3:11 PM CDT -----  Contact: [phone]/self  Patient returning your call  Please advise

## 2020-03-25 NOTE — TELEPHONE ENCOUNTER
Spoke with patient on telephone. Told her that her COVID-19 test was positive. Patient now 14 days or more since symptom onset. She is feeling well. No fever or respiratory symptoms currently.   I have asked patient her to contact me with any symptoms of fever or respiratory symptoms such as cough or shortness of breath. States that she will.   All questions answered.

## 2020-03-25 NOTE — TELEPHONE ENCOUNTER
Just received an email from Deb Chavez in sendout lab that Mrs. Armando's coronavirus lab results have been changed from negative to positive. She has tried to contact the ordering physician-an emergency room doctor-but has been unable to reach him/her so she contacted me.   I just called patient at her home/cell number but got no answer. So I left a message telling her that I want to discuss some results and she should call the clinic back.

## 2020-03-25 NOTE — TELEPHONE ENCOUNTER
----- Message from Marie Grider MD sent at 3/25/2020 11:59 AM CDT -----  Please continue to try to reach patient so I can talk to her about her positive coronavirus test.   Thanks

## 2020-04-21 DIAGNOSIS — Z01.84 ANTIBODY RESPONSE EXAMINATION: ICD-10-CM

## 2020-05-03 ENCOUNTER — HOSPITAL ENCOUNTER (EMERGENCY)
Facility: HOSPITAL | Age: 30
Discharge: HOME | End: 2020-05-03
Attending: EMERGENCY MEDICINE
Payer: COMMERCIAL

## 2020-05-03 VITALS
RESPIRATION RATE: 20 BRPM | OXYGEN SATURATION: 99 % | HEIGHT: 64 IN | DIASTOLIC BLOOD PRESSURE: 90 MMHG | BODY MASS INDEX: 29.88 KG/M2 | SYSTOLIC BLOOD PRESSURE: 139 MMHG | WEIGHT: 175 LBS | TEMPERATURE: 100 F | HEART RATE: 85 BPM

## 2020-05-03 DIAGNOSIS — S82.142A TIBIAL PLATEAU FRACTURE, LEFT, CLOSED, INITIAL ENCOUNTER: Primary | ICD-10-CM

## 2020-05-03 PROCEDURE — 99284 EMERGENCY DEPT VISIT MOD MDM: CPT | Mod: 25,ER

## 2020-05-03 PROCEDURE — 25000003 PHARM REV CODE 250: Mod: ER | Performed by: PHYSICIAN ASSISTANT

## 2020-05-03 RX ORDER — KETOROLAC TROMETHAMINE 10 MG/1
10 TABLET, FILM COATED ORAL
Status: COMPLETED | OUTPATIENT
Start: 2020-05-03 | End: 2020-05-03

## 2020-05-03 RX ORDER — HYDROCODONE BITARTRATE AND ACETAMINOPHEN 5; 325 MG/1; MG/1
1 TABLET ORAL EVERY 4 HOURS PRN
Qty: 18 TABLET | Refills: 0 | Status: SHIPPED | OUTPATIENT
Start: 2020-05-03 | End: 2020-05-06

## 2020-05-03 RX ORDER — IBUPROFEN 600 MG/1
600 TABLET ORAL EVERY 8 HOURS PRN
Qty: 21 TABLET | Refills: 0 | Status: SHIPPED | OUTPATIENT
Start: 2020-05-03 | End: 2020-08-18

## 2020-05-03 RX ORDER — HYDROCODONE BITARTRATE AND ACETAMINOPHEN 5; 325 MG/1; MG/1
1 TABLET ORAL
Status: COMPLETED | OUTPATIENT
Start: 2020-05-03 | End: 2020-05-03

## 2020-05-03 RX ADMIN — HYDROCODONE BITARTRATE AND ACETAMINOPHEN 1 TABLET: 5; 325 TABLET ORAL at 03:05

## 2020-05-03 RX ADMIN — KETOROLAC TROMETHAMINE 10 MG: 10 TABLET, FILM COATED ORAL at 02:05

## 2020-05-03 NOTE — ED PROVIDER NOTES
Encounter Date: 5/3/2020       History     Chief Complaint   Patient presents with    Knee Pain     c/o left knee pain s/p slipping on floor causing it to hyperextend; + swelling noted     Patient is a 30 year old female presenting with constant, severe aching pain in the left knee secondary to a slip and fall home just prior to arrival. The knee hyperextended in the fall. The pain is worse with movement and weight bearing. No radicular symptoms. No numbness or focal weakness. No treatment prior to arrival.         Review of patient's allergies indicates:  No Known Allergies  Past Medical History:   Diagnosis Date    Abnormal Pap smear of cervix 2018    ASCUS , Negative HPV    Cancer     Hypertension      Past Surgical History:   Procedure Laterality Date     SECTION      LOOP ELECTROSURGICAL EXCISION PROCEDURE (LEEP) N/A 2019    Procedure: LEEP (LOOP ELECTROSURGICAL EXCISION PROCEDURE);  Surgeon: Judith Mane MD;  Location: [facility];  Service: OB/GYN;  Laterality: N/A;    REDUCTION OF BOTH BREASTS Bilateral 3/21/2019    Procedure: MAMMOPLASTY, REDUCTION, BILATERAL;  Surgeon: Bill Beebe MD;  Location: [address];  Service: Plastics;  Laterality: Bilateral;  Dx. Code--- N62, M54.2, G89.29, M54.9, R21   Procedure--- 29015 Hemant Breast Reduction      Family History   Problem Relation Age of Onset    Hypertension Mother     No Known Problems Father     Heart disease Maternal Grandfather      Social History     Tobacco Use    Smoking status: Never Smoker    Smokeless tobacco: Never Used   Substance Use Topics    Alcohol use: No     Alcohol/week: 0.0 standard drinks     Frequency: Monthly or less     Drinks per session: 3 or 4     Binge frequency: Never     Comment: occasionally    Drug use: No     Review of Systems   Constitutional: Negative for activity change, appetite change, chills and fever.   Musculoskeletal:        + left knee pain + swelling   Skin: Negative for  color change, rash and wound.   Neurological: Negative for weakness and numbness.   All other systems reviewed and are negative.      Physical Exam     Initial Vitals [05/03/20 1450]   BP Pulse Resp Temp SpO2   (!) 139/90 85 20 99.7 °F (37.6 °C) 99 %      MAP       --         Physical Exam    Nursing note and vitals reviewed.  Constitutional: She appears well-developed and well-nourished. She appears distressed.   HENT:   Head: Normocephalic and atraumatic.   Cardiovascular: Normal rate, regular rhythm and intact distal pulses.   Pulmonary/Chest: Breath sounds normal. No respiratory distress.   Musculoskeletal:   Left knee significant swelling. Tenderness to palpation along the quadriceps insertions. Significant pain with any attempted movement. No posterior calf tenderness or swelling. No tenderness in the ankle or hip. Good distal pulses.    Neurological: She is alert and oriented to person, place, and time.   Skin: Skin is warm and dry. No rash noted. No erythema.   No abrasions or lacerations   Psychiatric: She has a normal mood and affect. Her behavior is normal. Judgment and thought content normal.         ED Course   Procedures  Labs Reviewed - No data to display       Imaging Results          X-Ray Knee 1 or 2 View Left (Final result)  Result time 05/03/20 15:28:46    Final result by Reddy Monique MD (05/03/20 15:28:46)                 Impression:      1.  Nondisplaced lateral tibial plateau fracture with involvement of the lateral tibial spine.  Associated large lipohemarthrosis.      Electronically signed by: Reddy Monique MD  Date:    05/03/2020  Time:    15:28             Narrative:    EXAMINATION:  XR KNEE 1 OR 2 VIEW LEFT    CLINICAL HISTORY:  left knee pain;    TECHNIQUE:  AP and lateral views of the left knee were performed.    COMPARISON:  None    FINDINGS:  There is a large lipohemarthrosis.  There is a nondisplaced fracture of the lateral tibial plateau extending into the lateral tibial  spine.    The femur, patella and fibula appear to be intact.                                 Medical Decision Making:   Clinical Tests:   Radiological Study: Ordered and Reviewed  Non-displaced tibial plateau fracture. I called Dr. Dennis, orthopedics, and he advised cast padding, ace bandage, and knee immobilizer. Strictly non-weight bearing. Patient will call his office tomorrow to schedule follow up. Return to the ED if worse in any way.                                  Clinical Impression:       ICD-10-CM ICD-9-CM   1. Tibial plateau fracture, left, closed, initial encounter S82.142A 823.00         Disposition:   Disposition: Discharged     ED Disposition Condition    Discharge Stable        ED Prescriptions     Medication Sig Dispense Start Date End Date Auth. Provider    HYDROcodone-acetaminophen (NORCO) 5-325 mg per tablet Take 1 tablet by mouth every 4 (four) hours as needed (severe pain). 18 tablet 5/3/2020 5/6/2020 LEXIE Boothe    ibuprofen (ADVIL,MOTRIN) 600 MG tablet Take 1 tablet (600 mg total) by mouth every 8 (eight) hours as needed. 21 tablet 5/3/2020  LEXIE Boothe        Follow-up Information     Follow up With Specialties Details Why Contact Info    Vinicio Dennis Jr., MD Hand Surgery, Orthopedic Surgery In 1 day  [address]  [place] [address]  [phone]                                       LEXIE Boothe  05/03/20 1172

## 2020-05-05 ENCOUNTER — HOSPITAL ENCOUNTER (OUTPATIENT)
Dept: RADIOLOGY | Facility: HOSPITAL | Age: 30
Discharge: HOME | End: 2020-05-05
Attending: ORTHOPAEDIC SURGERY
Payer: COMMERCIAL

## 2020-05-05 DIAGNOSIS — M25.562 LEFT KNEE PAIN: ICD-10-CM

## 2020-05-05 DIAGNOSIS — R93.89 ABNORMAL X-RAY: ICD-10-CM

## 2020-05-05 PROCEDURE — 73562 X-RAY EXAM OF KNEE 3: CPT | Mod: TC,FY,LT

## 2020-05-05 PROCEDURE — 73700 CT LOWER EXTREMITY W/O DYE: CPT | Mod: 26,LT,, | Performed by: RADIOLOGY

## 2020-05-05 PROCEDURE — 73700 CT KNEE WITHOUT CONTRAST LEFT: ICD-10-PCS | Mod: 26,LT,, | Performed by: RADIOLOGY

## 2020-05-05 PROCEDURE — 73700 CT LOWER EXTREMITY W/O DYE: CPT | Mod: TC,LT

## 2020-05-05 PROCEDURE — 73562 X-RAY EXAM OF KNEE 3: CPT | Mod: 26,LT,, | Performed by: RADIOLOGY

## 2020-05-05 PROCEDURE — 73562 XR KNEE AP LAT WITH SUNRISE LEFT: ICD-10-PCS | Mod: 26,LT,, | Performed by: RADIOLOGY

## 2020-05-08 NOTE — PLAN OF CARE
Patient instructed to proceed to [facility] on [address] [facility] tomorrow 5/9/20 for covid testing prior to surgery on Monday.  Pt verbalized understanding.

## 2020-05-09 ENCOUNTER — PATIENT MESSAGE (OUTPATIENT)
Dept: ADMINISTRATIVE | Facility: HOSPITAL | Age: 30
End: 2020-05-09

## 2020-05-09 ENCOUNTER — ANESTHESIA EVENT (OUTPATIENT)
Dept: SURGERY | Facility: HOSPITAL | Age: 30
End: 2020-05-09
Payer: COMMERCIAL

## 2020-05-09 ENCOUNTER — CLINICAL SUPPORT (OUTPATIENT)
Dept: URGENT CARE | Facility: CLINIC | Age: 30
End: 2020-05-09
Payer: COMMERCIAL

## 2020-05-09 VITALS — OXYGEN SATURATION: 100 % | TEMPERATURE: 99 F | HEART RATE: 84 BPM

## 2020-05-09 DIAGNOSIS — Z01.818 PREOPERATIVE TESTING: ICD-10-CM

## 2020-05-09 PROCEDURE — U0002 COVID-19 LAB TEST NON-CDC: HCPCS

## 2020-05-10 LAB — SARS-COV-2 RNA RESP QL NAA+PROBE: NOT DETECTED

## 2020-05-10 NOTE — H&P
CT reviewed, displaced tibial eminence fracture involving ACL insertion, to OR for arthroscopically assisted ORIF    Left knee injury     History of Present Illness    Mrs. Armando is a 30 year old female who sustained an injury to the left knee 3 days ago. She reports sustaining a hyperextension injury while in her home. She was evaluated in the [facility] ED on the day of the injury. Plain x-rays revealed a fracture of her left knee. She was placed in a knee immobilizer and given pain medications and crutches. She currently reports 10/10 left knee pain. She cannot bear weight. She denies any prior injury or surgery to the left knee.     Allergies   · No Known Allergies    Current Meds    Medication Name Instruction   HYDROcodone-Acetaminophen 5-325 MG Oral Tablet    Ibuprofen 600 MG Oral Tablet      Active Problems   · Left knee pain, unspecified chronicity (M25.562)    Past Medical History   · History of hypertension (Z86.79)   · History of malignant neoplasm (Z85.9)    Surgical History   · History of Breast reduction   · History of Breast surgery   · History of  section   · History of Loop electrosurgical excision procedure    Family History   · Family history of arthritis (Z82.61)   · Family history of malignant neoplasm (Z80.9)    Social History   · Never a smoker   · No alcohol use    Review of Systems    Review of systems have been reviewed and noted on the intake form dated 2020     Results/Data    I have reviewed the plain x-rays obtained in the ER. There is a tibial spine avulsion. CT recommended for further fracture delineation.     Vitals   Recorded: [id number] 11:34AM   Height 5 ft 4 in   Weight 175 lb    BMI Calculated 30.04   BSA Calculated 1.85   Systolic 120   Diastolic 70   Heart Rate 89   Respiration 18   Pain Scale 9   Location: Knee     Physical Exam    General:  Alert, NAD. Oriented x 3. Appears stated age of 30 year.    Normal mood and affect.    Gait: Deferred. Sitting in  wheelchair    LLE Exam:  Skin intact. No rash or cellulitis.   No lymphedema.   Large, tense effusion in the knee. TTP along the medial and lateral joint lines. No obvious laxity with slight varus and valgus stress (exam limited due to pain). ROM 0-80.   Quad strength 4+/5.  2+ DP pulse.  Light touch intact in the peroneal and tibial distributions.     Assessment   1. Tibial plateau fracture, left, closed, initial encounter (S82.142A)    Orders  SocHx: Never a smoker    1. Tobacco Use Screening; Status:Complete;   Done: 05May2020  Tibial plateau fracture, left, closed, initial encounter    2. Changed: From  HYDROcodone-Acetaminophen 5-325 MG Oral Tablet  To   HYDROcodone-Acetaminophen 5-325 MG Oral Tablet TAKE 1 TABLET EVERY 6 HOURS   AS NEEDED FOR PAIN   3. CT Low Extmty; w/o Contrast, Knee ( 62182 ); Status:Active; Requested for:05May2020;     Procedure Note    Pre-procedure Diagnosis:  Left tibial spine fracture    Indication:  Promote ROM    Post-procedure Diagnosis:  Left tibial spine fracture    Procedure:  Left knee arthrocentesis    The left knee was prepped and draped in the usual sterile fashion. 1% lidocaine was utilized to anesthetize the anterolateral knee. An 18G needle was introduced and 65 cc of bloody fluid was obtained. The patient tolerated the procedure without any known difficulty.     Plan    Recommended that the patient undergo CT scan to further define the fracture pattern. I think it involves the ACL footprint so she may need surgical intervention. In addition, I have recommended continuation of the knee immobilizer and NWB on the LLE. Lastly, I will proceed with arthrocentesis of the knee.     There are no interval changes to report in the history and physical exam.

## 2020-05-11 ENCOUNTER — ANESTHESIA (OUTPATIENT)
Dept: SURGERY | Facility: HOSPITAL | Age: 30
End: 2020-05-11
Payer: COMMERCIAL

## 2020-05-11 ENCOUNTER — HOSPITAL ENCOUNTER (OUTPATIENT)
Facility: HOSPITAL | Age: 30
Discharge: HOME | End: 2020-05-11
Attending: ORTHOPAEDIC SURGERY | Admitting: ORTHOPAEDIC SURGERY
Payer: COMMERCIAL

## 2020-05-11 VITALS
TEMPERATURE: 98 F | SYSTOLIC BLOOD PRESSURE: 150 MMHG | HEIGHT: 64 IN | RESPIRATION RATE: 20 BRPM | DIASTOLIC BLOOD PRESSURE: 87 MMHG | WEIGHT: 175 LBS | OXYGEN SATURATION: 99 % | HEART RATE: 85 BPM | BODY MASS INDEX: 29.88 KG/M2

## 2020-05-11 DIAGNOSIS — Z01.818 PREOPERATIVE TESTING: Primary | ICD-10-CM

## 2020-05-11 DIAGNOSIS — S82.142A TIBIAL PLATEAU FRACTURE, LEFT, CLOSED, INITIAL ENCOUNTER: ICD-10-CM

## 2020-05-11 LAB
B-HCG UR QL: NEGATIVE
CTP QC/QA: YES

## 2020-05-11 PROCEDURE — 37000009 HC ANESTHESIA EA ADD 15 MINS: Performed by: ORTHOPAEDIC SURGERY

## 2020-05-11 PROCEDURE — 25000003 PHARM REV CODE 250: Performed by: STUDENT IN AN ORGANIZED HEALTH CARE EDUCATION/TRAINING PROGRAM

## 2020-05-11 PROCEDURE — 37000008 HC ANESTHESIA 1ST 15 MINUTES: Performed by: ORTHOPAEDIC SURGERY

## 2020-05-11 PROCEDURE — 81025 URINE PREGNANCY TEST: CPT | Performed by: ORTHOPAEDIC SURGERY

## 2020-05-11 PROCEDURE — 63600175 PHARM REV CODE 636 W HCPCS: Performed by: STUDENT IN AN ORGANIZED HEALTH CARE EDUCATION/TRAINING PROGRAM

## 2020-05-11 PROCEDURE — C1713 ANCHOR/SCREW BN/BN,TIS/BN: HCPCS | Performed by: ORTHOPAEDIC SURGERY

## 2020-05-11 PROCEDURE — 36000711: Performed by: ORTHOPAEDIC SURGERY

## 2020-05-11 PROCEDURE — 63600175 PHARM REV CODE 636 W HCPCS: Performed by: ORTHOPAEDIC SURGERY

## 2020-05-11 PROCEDURE — 36000710: Performed by: ORTHOPAEDIC SURGERY

## 2020-05-11 PROCEDURE — 01400 ANES OPN/ARTHRS KNEE JT NOS: CPT | Performed by: ORTHOPAEDIC SURGERY

## 2020-05-11 PROCEDURE — 71000016 HC POSTOP RECOV ADDL HR: Performed by: ORTHOPAEDIC SURGERY

## 2020-05-11 PROCEDURE — 71000033 HC RECOVERY, INTIAL HOUR: Performed by: ORTHOPAEDIC SURGERY

## 2020-05-11 PROCEDURE — 71000015 HC POSTOP RECOV 1ST HR: Performed by: ORTHOPAEDIC SURGERY

## 2020-05-11 PROCEDURE — 64447 NJX AA&/STRD FEMORAL NRV IMG: CPT | Performed by: STUDENT IN AN ORGANIZED HEALTH CARE EDUCATION/TRAINING PROGRAM

## 2020-05-11 PROCEDURE — 27201423 OPTIME MED/SURG SUP & DEVICES STERILE SUPPLY: Performed by: ORTHOPAEDIC SURGERY

## 2020-05-11 PROCEDURE — 25000242 PHARM REV CODE 250 ALT 637 W/ HCPCS

## 2020-05-11 PROCEDURE — 25000003 PHARM REV CODE 250: Performed by: ANESTHESIOLOGY

## 2020-05-11 PROCEDURE — 71000039 HC RECOVERY, EACH ADD'L HOUR: Performed by: ORTHOPAEDIC SURGERY

## 2020-05-11 PROCEDURE — 27200704 HC ULTRASOUND NDL/ECHOSTIM: Performed by: STUDENT IN AN ORGANIZED HEALTH CARE EDUCATION/TRAINING PROGRAM

## 2020-05-11 DEVICE — IMPLANTABLE DEVICE: Type: IMPLANTABLE DEVICE | Site: KNEE | Status: FUNCTIONAL

## 2020-05-11 RX ORDER — SODIUM CHLORIDE 0.9 % (FLUSH) 0.9 %
10 SYRINGE (ML) INJECTION
Status: DISCONTINUED | OUTPATIENT
Start: 2020-05-11 | End: 2020-05-11 | Stop reason: HOSPADM

## 2020-05-11 RX ORDER — NEOSTIGMINE METHYLSULFATE 1 MG/ML
INJECTION, SOLUTION INTRAVENOUS
Status: DISCONTINUED | OUTPATIENT
Start: 2020-05-11 | End: 2020-05-11

## 2020-05-11 RX ORDER — FENTANYL CITRATE 50 UG/ML
INJECTION, SOLUTION INTRAMUSCULAR; INTRAVENOUS
Status: DISCONTINUED | OUTPATIENT
Start: 2020-05-11 | End: 2020-05-11

## 2020-05-11 RX ORDER — MIDAZOLAM HYDROCHLORIDE 1 MG/ML
INJECTION, SOLUTION INTRAMUSCULAR; INTRAVENOUS
Status: DISCONTINUED | OUTPATIENT
Start: 2020-05-11 | End: 2020-05-11

## 2020-05-11 RX ORDER — HYDROMORPHONE HYDROCHLORIDE 2 MG/ML
0.5 INJECTION, SOLUTION INTRAMUSCULAR; INTRAVENOUS; SUBCUTANEOUS EVERY 5 MIN PRN
Status: DISCONTINUED | OUTPATIENT
Start: 2020-05-11 | End: 2020-05-11 | Stop reason: HOSPADM

## 2020-05-11 RX ORDER — PROPOFOL 10 MG/ML
VIAL (ML) INTRAVENOUS
Status: DISCONTINUED | OUTPATIENT
Start: 2020-05-11 | End: 2020-05-11

## 2020-05-11 RX ORDER — DEXAMETHASONE SODIUM PHOSPHATE 4 MG/ML
INJECTION, SOLUTION INTRA-ARTICULAR; INTRALESIONAL; INTRAMUSCULAR; INTRAVENOUS; SOFT TISSUE
Status: DISCONTINUED | OUTPATIENT
Start: 2020-05-11 | End: 2020-05-11

## 2020-05-11 RX ORDER — GLYCOPYRROLATE 0.2 MG/ML
INJECTION INTRAMUSCULAR; INTRAVENOUS
Status: DISCONTINUED | OUTPATIENT
Start: 2020-05-11 | End: 2020-05-11

## 2020-05-11 RX ORDER — CEFAZOLIN SODIUM 1 G/3ML
INJECTION, POWDER, FOR SOLUTION INTRAMUSCULAR; INTRAVENOUS
Status: DISCONTINUED | OUTPATIENT
Start: 2020-05-11 | End: 2020-05-11

## 2020-05-11 RX ORDER — HYDROMORPHONE HYDROCHLORIDE 2 MG/ML
0.5 INJECTION, SOLUTION INTRAMUSCULAR; INTRAVENOUS; SUBCUTANEOUS
Status: DISCONTINUED | OUTPATIENT
Start: 2020-05-11 | End: 2020-05-11

## 2020-05-11 RX ORDER — OXYCODONE HYDROCHLORIDE 5 MG/1
5 TABLET ORAL
Status: DISCONTINUED | OUTPATIENT
Start: 2020-05-11 | End: 2020-05-11 | Stop reason: HOSPADM

## 2020-05-11 RX ORDER — EPINEPHRINE 1 MG/ML
INJECTION INTRAMUSCULAR; INTRAVENOUS; SUBCUTANEOUS
Status: DISCONTINUED | OUTPATIENT
Start: 2020-05-11 | End: 2020-05-11 | Stop reason: HOSPADM

## 2020-05-11 RX ORDER — ACETAMINOPHEN 10 MG/ML
INJECTION, SOLUTION INTRAVENOUS
Status: DISCONTINUED | OUTPATIENT
Start: 2020-05-11 | End: 2020-05-11

## 2020-05-11 RX ORDER — OXYCODONE HYDROCHLORIDE 5 MG/1
5 TABLET ORAL ONCE
Status: COMPLETED | OUTPATIENT
Start: 2020-05-11 | End: 2020-05-11

## 2020-05-11 RX ORDER — ROPIVACAINE HYDROCHLORIDE 2 MG/ML
INJECTION, SOLUTION EPIDURAL; INFILTRATION; PERINEURAL
Status: DISCONTINUED | OUTPATIENT
Start: 2020-05-11 | End: 2020-05-11

## 2020-05-11 RX ORDER — HYDROCODONE BITARTRATE AND ACETAMINOPHEN 5; 325 MG/1; MG/1
1 TABLET ORAL EVERY 6 HOURS PRN
Qty: 20 TABLET | Refills: 0 | Status: SHIPPED | OUTPATIENT
Start: 2020-05-11 | End: 2020-08-18

## 2020-05-11 RX ORDER — SODIUM CHLORIDE, SODIUM LACTATE, POTASSIUM CHLORIDE, CALCIUM CHLORIDE 600; 310; 30; 20 MG/100ML; MG/100ML; MG/100ML; MG/100ML
INJECTION, SOLUTION INTRAVENOUS CONTINUOUS PRN
Status: DISCONTINUED | OUTPATIENT
Start: 2020-05-11 | End: 2020-05-11

## 2020-05-11 RX ORDER — HYDROMORPHONE HYDROCHLORIDE 2 MG/ML
0.2 INJECTION, SOLUTION INTRAMUSCULAR; INTRAVENOUS; SUBCUTANEOUS EVERY 5 MIN PRN
Status: DISCONTINUED | OUTPATIENT
Start: 2020-05-11 | End: 2020-05-11

## 2020-05-11 RX ORDER — ONDANSETRON 2 MG/ML
4 INJECTION INTRAMUSCULAR; INTRAVENOUS DAILY PRN
Status: DISCONTINUED | OUTPATIENT
Start: 2020-05-11 | End: 2020-05-11 | Stop reason: HOSPADM

## 2020-05-11 RX ORDER — ROCURONIUM BROMIDE 10 MG/ML
INJECTION, SOLUTION INTRAVENOUS
Status: DISCONTINUED | OUTPATIENT
Start: 2020-05-11 | End: 2020-05-11

## 2020-05-11 RX ORDER — SUCCINYLCHOLINE CHLORIDE 20 MG/ML
INJECTION INTRAMUSCULAR; INTRAVENOUS
Status: DISCONTINUED | OUTPATIENT
Start: 2020-05-11 | End: 2020-05-11

## 2020-05-11 RX ORDER — ONDANSETRON 2 MG/ML
INJECTION INTRAMUSCULAR; INTRAVENOUS
Status: DISCONTINUED | OUTPATIENT
Start: 2020-05-11 | End: 2020-05-11

## 2020-05-11 RX ORDER — LIDOCAINE HYDROCHLORIDE 20 MG/ML
INJECTION, SOLUTION EPIDURAL; INFILTRATION; INTRACAUDAL; PERINEURAL
Status: DISCONTINUED | OUTPATIENT
Start: 2020-05-11 | End: 2020-05-11

## 2020-05-11 RX ADMIN — FENTANYL CITRATE 50 MCG: 50 INJECTION, SOLUTION INTRAMUSCULAR; INTRAVENOUS at 01:05

## 2020-05-11 RX ADMIN — ROPIVACAINE HYDROCHLORIDE 15 ML: 2 INJECTION, SOLUTION EPIDURAL; INFILTRATION at 10:05

## 2020-05-11 RX ADMIN — PROPOFOL 40 MG: 10 INJECTION, EMULSION INTRAVENOUS at 11:05

## 2020-05-11 RX ADMIN — SUCCINYLCHOLINE CHLORIDE 120 MG: 20 INJECTION, SOLUTION INTRAMUSCULAR; INTRAVENOUS at 11:05

## 2020-05-11 RX ADMIN — OXYCODONE HYDROCHLORIDE 5 MG: 5 TABLET ORAL at 03:05

## 2020-05-11 RX ADMIN — HYDROMORPHONE HYDROCHLORIDE 0.5 MG: 2 INJECTION, SOLUTION INTRAMUSCULAR; INTRAVENOUS; SUBCUTANEOUS at 02:05

## 2020-05-11 RX ADMIN — Medication 40 MG: at 11:05

## 2020-05-11 RX ADMIN — FENTANYL CITRATE 25 MCG: 50 INJECTION, SOLUTION INTRAMUSCULAR; INTRAVENOUS at 12:05

## 2020-05-11 RX ADMIN — DEXAMETHASONE SODIUM PHOSPHATE 4 MG: 4 INJECTION, SOLUTION INTRA-ARTICULAR; INTRALESIONAL; INTRAMUSCULAR; INTRAVENOUS; SOFT TISSUE at 11:05

## 2020-05-11 RX ADMIN — Medication 30 MG: at 12:05

## 2020-05-11 RX ADMIN — GLYCOPYRROLATE 0.6 MG: 0.2 INJECTION, SOLUTION INTRAMUSCULAR; INTRAVENOUS at 01:05

## 2020-05-11 RX ADMIN — Medication 10 MG: at 01:05

## 2020-05-11 RX ADMIN — SODIUM CHLORIDE, SODIUM LACTATE, POTASSIUM CHLORIDE, AND CALCIUM CHLORIDE: .6; .31; .03; .02 INJECTION, SOLUTION INTRAVENOUS at 11:05

## 2020-05-11 RX ADMIN — PROPOFOL 150 MG: 10 INJECTION, EMULSION INTRAVENOUS at 11:05

## 2020-05-11 RX ADMIN — LIDOCAINE HYDROCHLORIDE 100 MG: 20 INJECTION, SOLUTION EPIDURAL; INFILTRATION; INTRACAUDAL; PERINEURAL at 11:05

## 2020-05-11 RX ADMIN — FENTANYL CITRATE 25 MCG: 50 INJECTION, SOLUTION INTRAMUSCULAR; INTRAVENOUS at 01:05

## 2020-05-11 RX ADMIN — ACETAMINOPHEN 1000 MG: 10 INJECTION, SOLUTION INTRAVENOUS at 01:05

## 2020-05-11 RX ADMIN — ROCURONIUM BROMIDE 10 MG: 10 INJECTION, SOLUTION INTRAVENOUS at 12:05

## 2020-05-11 RX ADMIN — Medication 10 MG: at 12:05

## 2020-05-11 RX ADMIN — OXYCODONE HYDROCHLORIDE 5 MG: 5 TABLET ORAL at 05:05

## 2020-05-11 RX ADMIN — ROCURONIUM BROMIDE 50 MG: 10 INJECTION, SOLUTION INTRAVENOUS at 11:05

## 2020-05-11 RX ADMIN — ONDANSETRON 4 MG: 2 INJECTION, SOLUTION INTRAMUSCULAR; INTRAVENOUS at 01:05

## 2020-05-11 RX ADMIN — DEXAMETHASONE SODIUM PHOSPHATE 4 MG: 4 INJECTION, SOLUTION INTRA-ARTICULAR; INTRALESIONAL; INTRAMUSCULAR; INTRAVENOUS; SOFT TISSUE at 10:05

## 2020-05-11 RX ADMIN — HYDROMORPHONE HYDROCHLORIDE 0.5 MG: 2 INJECTION, SOLUTION INTRAMUSCULAR; INTRAVENOUS; SUBCUTANEOUS at 03:05

## 2020-05-11 RX ADMIN — CEFAZOLIN 2 G: 330 INJECTION, POWDER, FOR SOLUTION INTRAMUSCULAR; INTRAVENOUS at 11:05

## 2020-05-11 RX ADMIN — MIDAZOLAM 5 MG: 1 INJECTION INTRAMUSCULAR; INTRAVENOUS at 10:05

## 2020-05-11 RX ADMIN — RACEPINEPHRINE HYDROCHLORIDE 0.5 ML: 11.25 SOLUTION RESPIRATORY (INHALATION) at 02:05

## 2020-05-11 RX ADMIN — NEOSTIGMINE METHYLSULFATE 4 MG: 1 INJECTION INTRAVENOUS at 01:05

## 2020-05-11 RX ADMIN — SODIUM CHLORIDE, SODIUM LACTATE, POTASSIUM CHLORIDE, AND CALCIUM CHLORIDE: .6; .31; .03; .02 INJECTION, SOLUTION INTRAVENOUS at 12:05

## 2020-05-11 RX ADMIN — Medication 20 MG: at 12:05

## 2020-05-11 NOTE — PLAN OF CARE
Patient experiencing stridor.  Aram Leger and William at bedside with patient.  Respiratory to bedside.  Racemic epi given per mar.

## 2020-05-11 NOTE — ANESTHESIA PROCEDURE NOTES
Peripheral Block    Patient location during procedure: post-op   Block not for primary anesthetic.  Reason for block: at surgeon's request and post-op pain management   Post-op Pain Location: left leg pain  Start time: 5/11/2020 10:35 AM  Timeout: 5/11/2020 10:34 AM   End time: 5/11/2020 10:38 AM    Staffing  Authorizing Provider: Jose Angel Franklin MD  Performing Provider: Ayana Jackson MD    Preanesthetic Checklist  Completed: patient identified, site marked, surgical consent, pre-op evaluation, timeout performed, IV checked, risks and benefits discussed and monitors and equipment checked  Peripheral Block  Patient position: supine  Prep: ChloraPrep  Patient monitoring: heart rate, cardiac monitor, continuous pulse ox, continuous capnometry and frequent blood pressure checks  Block type: adductor canal  Laterality: left  Injection technique: single shot  Needle  Needle type: Echogenic   Needle gauge: 21 G  Needle length: 3.5 in  Needle localization: anatomical landmarks     Assessment  Injection assessment: negative aspiration, negative parasthesia and local visualized surrounding nerve  Paresthesia pain: none  Heart rate change: no  Slow fractionated injection: yes

## 2020-05-11 NOTE — PLAN OF CARE
Patient has met PACU discharge criteria, VSS, pain well controlled. Family updated by phone. Released from PACU by Dr. aHrris.  Patient care continued with Ruby SMALL RN.

## 2020-05-11 NOTE — ANESTHESIA POSTPROCEDURE EVALUATION
Anesthesia Post Evaluation    Patient: Germaine Armando    Procedure(s) Performed: Procedure(s) (LRB):  OPEN REDUCTION INTERNAL FIXATION-TIBIAL PLATEAU MEDIAL FRACTURE W/KNEE SCOPE (Left)  ARTHROSCOPY, KNEE (Left)    Final Anesthesia Type: general    Patient location during evaluation: PACU  Patient participation: Yes- Able to Participate  Level of consciousness: awake and alert and oriented  Post-procedure vital signs: reviewed and stable  Pain management: adequate  Airway patency: patent    PONV status at discharge: No PONV  Anesthetic complications: no      Cardiovascular status: blood pressure returned to baseline and hemodynamically stable  Respiratory status: unassisted, spontaneous ventilation and room air  Hydration status: euvolemic  Follow-up not needed.          Vitals Value Taken Time   /75 5/11/2020  3:45 PM   Temp 36.8 °C (98.2 °F) 5/11/2020  2:15 PM   Pulse 82 5/11/2020  3:48 PM   Resp 13 5/11/2020  3:48 PM   SpO2 99 % 5/11/2020  3:48 PM   Vitals shown include unvalidated device data.      Event Time     Out of Recovery 15:46:00          Pain/Erika Score: Pain Rating Prior to Med Admin: 9 (5/11/2020  3:29 PM)  Erika Score: 8 (5/11/2020  2:11 PM)

## 2020-05-11 NOTE — ANESTHESIA PREPROCEDURE EVALUATION
2020  Germaine Armando is a 30 y.o., female presents for tibial plateau fx repair under GA  Past Medical History:   Diagnosis Date    Abnormal Pap smear of cervix 2018    ASCUS , Negative HPV    Cancer     Hypertension      Past Surgical History:   Procedure Laterality Date     SECTION      LOOP ELECTROSURGICAL EXCISION PROCEDURE (LEEP) N/A 2019    Procedure: LEEP (LOOP ELECTROSURGICAL EXCISION PROCEDURE);  Surgeon: Judith Mane MD;  Location: [facility];  Service: OB/GYN;  Laterality: N/A;    REDUCTION OF BOTH BREASTS Bilateral 3/21/2019    Procedure: MAMMOPLASTY, REDUCTION, BILATERAL;  Surgeon: Bill Beebe MD;  Location: [facility] OR [address] [place] [address];  Service: Plastics;  Laterality: Bilateral;  Dx. Code--- N62, M54.2, G89.29, M54.9, R21   Procedure--- 81595 Hemant Breast Reduction          COVID negative 20  Anesthesia Evaluation    I have reviewed the Patient Summary Reports.    I have reviewed the Nursing Notes.      Review of Systems  Anesthesia Hx:  No problems with previous Anesthesia    Hematology/Oncology:  Hematology Normal   Oncology Normal     EENT/Dental:EENT/Dental Normal   Cardiovascular:  Cardiovascular Normal     Pulmonary:  Pulmonary Normal    Renal/:  Renal/ Normal     Hepatic/GI:  Hepatic/GI Normal    Musculoskeletal:   Tibial plateau fracture   Neurological:  Neurology Normal    Endocrine:  Endocrine Normal    Dermatological:  Skin Normal    Psych:  Psychiatric Normal           Physical Exam  General:  Well nourished    Airway/Jaw/Neck:  Airway Findings: Mouth Opening: Normal General Airway Assessment: Adult  Mallampati: I  TM Distance: Normal, at least 6 cm  Jaw/Neck Findings:  Neck ROM: Normal ROM     Eyes/Ears/Nose:  EYES/EARS/NOSE FINDINGS: Normal   Dental:  Dental Findings: In tact   Chest/Lungs:  Chest/Lungs Clear    Heart/Vascular:  Heart  Findings: Normal    Abdomen:  Abdomen Findings: Normal    Musculoskeletal:  Musculoskeletal Findings: Normal   Skin:  Skin Findings: Normal    Mental Status:  Mental Status Findings:  Cooperative, Alert and Oriented         Anesthesia Plan  Type of Anesthesia, risks & benefits discussed:  Anesthesia Type:  regional, general  Patient's Preference:   Intra-op Monitoring Plan: standard ASA monitors  Intra-op Monitoring Plan Comments:   Post Op Pain Control Plan: per primary service following discharge from PACU  Post Op Pain Control Plan Comments:   Induction:   IV  Beta Blocker:  Patient is not currently on a Beta-Blocker (No further documentation required).       Informed Consent: Patient understands risks and agrees with Anesthesia plan.  Questions answered. Anesthesia consent signed with patient.  ASA Score: 2     Day of Surgery Review of History & Physical:  There are no significant changes.          Ready For Surgery From Anesthesia Perspective.

## 2020-05-11 NOTE — TRANSFER OF CARE
Patient Education     Folliculitis  Folliculitis is an inflammation of a hair follicle. A hair follicle is the little pocket where a hair grows out of the skin. Bacteria normally live on the skin. But sometimes bacteria can get trapped in a follicle and cause infection. This causes a bumpy rash. The area over the follicles is red and raised. It may itch or be painful. The bumps may have fluid (pus) inside. The pus may leak and then form crusts. Sores can spread to other areas of the body. Once it goes away, folliculitis can come back at any time. Severe cases may cause permanent hair loss and scarring.  Folliculitis can happen anywhere on the body where hair grows. It can be caused by rubbing from tight clothing. Ingrown hairs can cause it. Soaking in a hot tub or swimming pool that has bacteria in the water can cause it. It may also occur if a hair follicle is blocked by a bandage.  Sores often go away in a few days with no treatment. In some cases, medicine may be given. A small piece of skin or pus may be taken to find the type of bacteria causing the infection.  Home care  The healthcare provider may prescribe an antibiotic cream or ointment.  Oral antibiotics may also be prescribed. Or you may be told to use an over-the-counter antibiotic cream. Follow all instructions when using any of these medicines.  General care    Apply warm, moist compresses to the sores for 20 minutes up to 3 times a day. You can make a compress by soaking a cloth in warm water. Squeeze out excess water.    Don t cut, poke, or squeeze the sores. This can be painful and spread infection.    Don t scratch the affected area. Scratching can delay healing.    Don t shave the areas affected by folliculitis.    If the sores leak fluid, cover the area with a nonstick gauze bandage. Use as little tape as possible. Carefully discard all soiled bandages.    Dress in loose cotton clothing.    Change sheets and blankets if they are soiled by pus.  "Anesthesia Transfer of Care Note    Patient: Germaine Armando    Procedure(s) Performed: Procedure(s) (LRB):  OPEN REDUCTION INTERNAL FIXATION-TIBIAL PLATEAU MEDIAL FRACTURE W/KNEE SCOPE (Left)  ARTHROSCOPY, KNEE (Left)    Patient location: PACU    Anesthesia Type: general    Transport from OR: Transported from OR on 6-10 L/min O2 by face mask with adequate spontaneous ventilation    Post pain: adequate analgesia    Post assessment: no apparent anesthetic complications    Post vital signs: stable    Level of consciousness: awake and alert    Nausea/Vomiting: no nausea/vomiting    Complications: none    Transfer of care protocol was followed      Last vitals:   Visit Vitals  /80 (BP Location: Right arm, Patient Position: Sitting)   Pulse 81   Temp 36 °C (96.8 °F) (Skin)   Resp 10   Ht 5' 4" (1.626 m)   Wt 79.4 kg (175 lb)   SpO2 100%   Breastfeeding? No   BMI 30.04 kg/m²     " Wash all clothes, towels, sheets, and cloth diapers in soap and hot water. Do not share clothes, towels, or sheets with other family members.    Do not soak the sores in bath water. This can spread infection. Instead, keep the area clean by gently washing sores with soap and warm water.    Wash your hands or use antibacterial gels often to prevent spreading the bacteria.  Follow-up care  Follow up with your healthcare provider, or as advised.  When to seek medical advice  Call your healthcare provider right away if any of these occur:    Fever of 100.4 F (38 C) or higher    Spreading of the rash    Rash does not get better with treatment    Redness or swelling that gets worse    Rash becomes more painful    Foul-smelling fluid leaking from the skin    Rash improves, but then comes back   Date Last Reviewed: 11/1/2016 2000-2019 [employer]. [address] [place] [address]. All rights reserved. This information is not intended as a substitute for professional medical care. Always follow your healthcare professional's instructions.           Patient Education     Blepharitis    Blepharitis is an inflammation of the eyelid. It results in swelling of the eyelids, and it is often caused by a bacterial infection or a skin condition. Blepharitis is a common eye condition. There are two types. Anterior blepharitis occurs where the eyelashes are attached (outside front edge of the eyelid). Posterior blepharitis affects the inner edge of the eyelid that touches the eyeball.  In addition to swollen eyelids, blepharitis symptoms can include thick, yellow, dandruff-like scales that stick to the eyelid. There may be oily patches on the eyelid. The eyelashes may be crusted (with dandruff-like scales) when you wake up from sleeping. The irritated area may itch. The eyelids may be red. The eyes can be red and burn or sting. The eyes may tear a lot, or be dry. You can become sensitive to light or have  blurred vision. Symptoms of blepharitis can cause irritability.  Blepharitis is a chronic condition and hard to cure. Even with successful treatment, recurrences are common. Good hygiene and home treatments (in the Home care section below) can improve your condition.  Causes  Causes of blepharitis may include:    Problems with the oil glands in the eyelid (meibomian glands)    Dandruff of the scalp and eyebrows (seborrheic dermatitis)    Acne rosacea (a skin condition that causes redness of the face, and other symptoms)    Eyelash mites (tiny organisms in the eyelash follicles)    Allergic reactions to cosmetics or medicines  Home care  Medicine: The healthcare provider may prescribe an antibiotic eye drops or ointment, artificial tears, and/or steroid eye drops. Follow all instructions for using these medicines. Use all medicines as directed. If you have pain, take medicine as advised by the healthcare provider.    Wash your hands carefully with soap and warm water before and after caring for your eyes.    Apply a warm compress or a warm, moist washcloth to the eyelids for 1 minute, 2 to 3 times a day, to loosen the crust. Then, wipe away scales or crust from the eyelids.    After applying the warm compress, gently scrub the base of the eyelashes for almost 15 seconds per eyelid. To do this, close your eyes and use a moist eyelid cleansing wipe, clean washcloth, or cotton swab. Ask your healthcare provider about products (such as gentle baby shampoo) to use to help clean the eyelids.    You may be instructed to gently massage your eyelids to help unblock the eyelid glands. Follow all instructions given by the healthcare provider.    Unless told otherwise, on a regular basis, with eyes closed, clean your eyelids as directed by the healthcare provider. Blepharitis can be an ongoing problem.    Don't wear eye makeup until the inflammation goes away, or as directed by your healthcare provider.    Unless told otherwise,  stop using contact lenses until you complete treatment for the condition.    Wash your hands regularly to help prevent dirt and bacteria from coming in contact with your eyelid.  Follow-up care  Follow up with your healthcare provider, or as advised. Your healthcare provider may refer you to an eye specialist (an optometrist or ophthalmologist) for further evaluation and treatment.  When to seek medical advice  Call your healthcare provider right away if any of these occur:    Increase in redness of the white part of the eye    Increase in swelling, redness, irritation, or pain of the eyelids    Eye pain    Change in vision (trouble seeing or blurring)    Drainage (pus, blood) from the eyelid    Fever of 100.4 F (38 C) or higher, or as directed by your healthcare provider  Date Last Reviewed: 3/1/2018    1849-9256 [employer]. [address] [place] [address], [place] [address]. All rights reserved. This information is not intended as a substitute for professional medical care. Always follow your healthcare professional's instructions.

## 2020-05-11 NOTE — OP NOTE
\[facility]\"" Orthopedics Operative Note    Patient Information:  Germaine Armando    Date of Surgery:  2020    Staff Attending:  Dr. Granado was present during the critical portions of this case.    Surgeon:  Angel Granado MD    Assistant:  Myke Peters, PGY5    Pre-operative Diagnosis:  Left tibial eminence fracture    Post-operative Diagnosis:  Same    Procedure Performed:  Arthroscopically assisted open reduction internal fixation left tibial eminence fracture    Anesthesia:  General with abductor canal block    Complications:  None    Blood Loss:  10 ml    Tourniquet time:  60    Specimens:  None    Implants:  Implant Name Type Inv. Item Serial No.  Lot No. LRB No. Used Action   IMplant system secondary fixation with Bio[employer]posite Swivelock    [employer] [id number] Left 1 Implanted        Indications for Procedure:  Germaine Armando is a 30 y.o. female that who sustained a hyperextension axial loading injury approximately 2 weeks ago.  She works in patient care.  Saw her in clinic x-rays were concerning for tibial eminence fracture.  Based on these findings we recommended a CT scan.  CT scan demonstrated avulsion of the ACL insertion.  We discussed risks benefits alternatives to surgery including arthrofibrosis infection loss of function further instability and she elected to proceed Informed consent was obtained and signed prior going to the operative room.    Procedure in Detail:  Patient was brought to the operating room and placed under General anesthesia by the anesthesia department without difficulty. The patient was then placed in the supine position on the operating room table.     We then performed an examination under anesthesia. ROM revealed 0-130 degrees. Stability testing revealed the followinB Lachman, negative posterior drawer, negative varus/valgus stress test    Time out was performed and all parties present agreed with correct patient, correct procedure, correct side,  correct site. The left extremity was prepped and draped in standard normal fashion.     Pre-incision antibiotics were administered prior to skin incision.    We then inflated the tourniquet to 250 mm of mercury.  Standard anterolateral portal was established.  Diagnostic knee arthroscopy was performed.  This demonstrated no evidence of medial or lateral meniscal tears. No other significant chondral injury noted..  There was a comminuted fracture of the tibial eminence involving the ACL insertion.  We then established medial portal using a spinal needle under direct visualization.  The fracture bed was elevated debrided.  We then made a trans patellar portal to assist in our arthroscopic reduction of our tibial eminence fracture.  With adequate debridement of the fat pad & hematoma at the fracture site, a suture Lasso was then used to pass 2 Arthrex suture tapes anterior and posterior through the ACL.  We then used tibial ACL guide to drill 2 consecutive tunnels through the fracture bed in the appropriate location.  Through this tunnel, a straight micro-suture lasso was placed and utilized to shuttle the suture tapes from the ACL through the drill tunnels in a crossed fashion. We then pulled tension on our sutures and confirmed arthroscopically that the fracture was reduced in the appropriate location and the ACL had adequate tension.  We cycled the knee 15 times.  With the knee in full extension and tension applied to the suture tapes, we secured them in the tibia with an Arthrex 4.75 mm SwivelLock. There was excellent fixation of the anchor.  Suture limbs were then tied together as backup fixation. Repeat arthroscopic visualization of the ACL revealed excellent tension and position. No evidence of any ACL impingement on the roof.   The remaining arthroscopic fluid was removed from the knee and the wounds were closed in layered fashion with 3-0 nylon on the skin.  Sterile dressing was placed as well as a hinged knee  brace and the patient was awakened from anesthesia having suffered no complications.      Post-operative Plan:  The patient may be weight-bearing as tolerated with the knee locked in full extension.  When she is seated she can unlock the brace to 90 for range of motion.  Will see her back in clinic in 2 weeks for wound check and suture removal.

## 2020-05-11 NOTE — BRIEF OP NOTE
[facility]  Brief Operative Note    Surgery Date: 5/11/2020     Surgeon(s) and Role:     * Angel Granado MD - Primary    Assisting Surgeon: None    Pre-op Diagnosis:  Left tibial eminence fracture    Post-op Diagnosis:  With the same    Procedure:  Arthroscopically assisted Open reduction internal fixation left tibial eminence fracture  Anesthesia:  General    Description of the findings of the procedure(s):  See operative report    Estimated Blood Loss: * No values recorded between 5/11/2020 12:08 PM and 5/11/2020  2:06 PM *         Specimens:   Specimen (12h ago, onward)    None            Discharge Note    OUTCOME: Patient tolerated treatment/procedure well without complication and is now ready for discharge.    DISPOSITION: Home or Self Care    FINAL DIAGNOSIS:  Tibial plateau fracture, left, closed, initial encounter    FOLLOWUP: In clinic    DISCHARGE INSTRUCTIONS:    Discharge Procedure Orders   COVID-19 Routine Screening   Standing Status: Future Number of Occurrences: 1 Standing Exp. Date: 07/07/21   Order Comments: Rapid testing please     Order Specific Question Answer Comments   Is the patient symptomatic? No      Diet Adult Regular     Notify your health care provider if you experience any of the following:  temperature >100.4     Activity as tolerated   Order Comments: May be WBAT, HKB locked in extension when ambulating, may unlock for rom when seated

## 2020-05-11 NOTE — DISCHARGE INSTRUCTIONS
Discharge Instructions for Knee Arthroscopy  You had knee arthroscopy. This surgical procedure uses small incisions to locate, identify, and treat problems inside the knee. These problems include loose bodies, meniscal tears, bone spurs, osteochondritis dissecans (OCD), and synovitis. Below are tips to help speed your recovery from surgery.  Activity  · Dont drive until your doctor says its OK. And never drive while taking opioid pain medicine.  · Remember to take pain medicines as directed; dont wait for the pain to get bad. And don't drink alcohol while taking pain medicines.  · Follow weight-bearing instructions given by your doctor. He or she may require you to use crutches to keep weight off your knee.  · Unless your doctor tells you otherwise, begin using the affected knee as much as you can tolerate 3 days after surgery.  · Slowly bend and straighten your affected leg as far as you can, unless your doctor tells you otherwise. Do this several times a day.  · Rest your knee by lying down and putting pillows under it for the first 3 days after surgery. Keep your ankle elevated above the level of your heart. This helps keep swelling down.  · Follow your doctors instructions about wearing and caring for a brace, immobilizer, or elastic dressing.  · Point and flex your foot, and rotate your ankle as much as possible during the first few weeks following surgery. Also, wiggle your toes as much as possible.  Incision care  · Check your incision daily for redness, tenderness, or drainage.  · Dont be alarmed if there is some bruising, slight swelling of the knee, or a small amount of blood on the bandage.  · Adjust the bandage or brace as needed. It should feel supportive on your knee, but not too tight.   · Dont soak your incision in water (no hot tubs, bathtubs, swimming pools) until your doctor says its OK.  · Wait 2 day(s) after your surgery to begin showering. Then shower as needed. Cover your knee with  plastic to keep the dressing or brace dry. Once your dressing is removed, follow your doctors instructions for care of the wound. And sit on a shower stool so that you dont fall while showering.  · Use an ice pack or bag of frozen peas--or something similar--wrapped in a thin towel to reduce the swelling. Keep the foot elevated while you ice the knee. Apply the ice pack for 20 minutes; then remove it for 20 minutes. Repeat as needed. Icing helps reduce swelling.  Other precautions  · Arrange your household to keep the items you need within reach.  · Remove throw rugs, electrical cords, and anything else that may cause you to fall.  · Use nonslip bath mats, grab bars, an elevated toilet seat, and a shower chair in your bathroom.  · Use a cane, crutches, a walker, or handrails until your balance, flexibility, and strength improve, and you can put weight on your leg. And remember to ask for help from others when you need it.  · Free up your hands so that you can use them to keep balance. Use a mode pack, apron, or pockets to carry things.  Follow-up  Make a follow-up appointment as directed by your doctor.     When to seek medical attention  Call 911 right away if you have any of the following:  · Chest pain  · Shortness of breath  · Severe nausea  Otherwise, call your doctor immediately if you have any of the following:  · Pain that is not relieved by medicine or rest  · Continued bleeding through the bandage  · Tingling, numbness, or coldness in your foot or leg  · Fever above 100.4°F (38.0°C) or shaking chills  · Excessive swelling, increased redness, or any drainage around the incision  · Swelling, tenderness, or pain in your leg      Date Last Reviewed: 11/16/2015  © 1114-2282 [employer]. [address] [place] [address], [place] [address]. All rights reserved. This information is not intended as a substitute for professional medical care. Always follow your healthcare professional's  instructions.          Acetaminophen; Hydrocodone tablets or capsules  What is this medicine?  ACETAMINOPHEN; HYDROCODONE (a set a KASSY tristen fen; hari droe KOE done) is a pain reliever. It is used to treat moderate to severe pain.  How should I use this medicine?  Take this medicine by mouth with a glass of water. Follow the directions on the prescription label. You can take it with or without food. If it upsets your stomach, take it with food. Do not take your medicine more often than directed.  A special MedGuide will be given to you by the pharmacist with each prescription and refill. Be sure to read this information carefully each time.  Talk to your pediatrician regarding the use of this medicine in children. Special care may be needed.  What side effects may I notice from receiving this medicine?  Side effects that you should report to your doctor or health care professional as soon as possible:  · allergic reactions like skin rash, itching or hives, swelling of the face, lips, or tongue  · breathing problems  · confusion  · redness, blistering, peeling or loosening of the skin, including inside the mouth  · signs and symptoms of low blood pressure like dizziness; feeling faint or lightheaded, falls; unusually weak or tired  · trouble passing urine or change in the amount of urine  · yellowing of the eyes or skin  Side effects that usually do not require medical attention (report to your doctor or health care professional if they continue or are bothersome):  · constipation  · dry mouth  · nausea, vomiting  · tiredness  What may interact with this medicine?  This medicine may interact with the following medications:  · alcohol  · antiviral medicines for HIV or AIDS  · atropine  · antihistamines for allergy, cough and cold  · certain antibiotics like erythromycin, clarithromycin  · certain medicines for anxiety or sleep  · certain medicines for bladder problems like oxybutynin, tolterodine  · certain medicines for  depression like amitriptyline, fluoxetine, sertraline  · certain medicines for fungal infections like ketoconazole and itraconazole  · certain medicines for Parkinson's disease like benztropine, trihexyphenidyl  · certain medicines for seizures like carbamazepine, phenobarbital, phenytoin, primidone  · certain medicines for stomach problems like dicyclomine, hyoscyamine  · certain medicines for travel sickness like scopolamine  · general anesthetics like halothane, isoflurane, methoxyflurane, propofol  · ipratropium  · local anesthetics like lidocaine, pramoxine, tetracaine  · MAOIs like Carbex, Eldepryl, Marplan, Nardil, and Parnate  · medicines that relax muscles for surgery  · other medicines with acetaminophen  · other narcotic medicines for pain or cough  · phenothiazines like chlorpromazine, mesoridazine, prochlorperazine, thioridazine  · rifampin  What if I miss a dose?  If you miss a dose, take it as soon as you can. If it is almost time for your next dose, take only that dose. Do not take double or extra doses.  Where should I keep my medicine?  Keep out of the reach of children. This medicine can be abused. Keep your medicine in a safe place to protect it from theft. Do not share this medicine with anyone. Selling or giving away this medicine is dangerous and against the law.  This medicine may cause accidental overdose and death if it taken by other adults, children, or pets. Mix any unused medicine with a substance like cat litter or coffee grounds. Then throw the medicine away in a sealed container like a sealed bag or a coffee can with a lid. Do not use the medicine after the expiration date.  Store at room temperature between 15 and 30 degrees C (59 and 86 degrees F).  What should I tell my health care provider before I take this medicine?  They need to know if you have any of these conditions:  · brain tumor  · Crohn's disease, inflammatory bowel disease, or ulcerative colitis  · drug abuse or  addiction  · head injury  · heart or circulation problems  · if you often drink alcohol  · kidney disease or problems going to the bathroom  · liver disease  · lung disease, asthma, or breathing problems  · an unusual or allergic reaction to acetaminophen, hydrocodone, other opioid analgesics, other medicines, foods, dyes, or preservatives  · pregnant or trying to get pregnant  · breast-feeding  What should I watch for while using this medicine?  Tell your doctor or health care professional if your pain does not go away, if it gets worse, or if you have new or a different type of pain. You may develop tolerance to the medicine. Tolerance means that you will need a higher dose of the medicine for pain relief. Tolerance is normal and is expected if you take the medicine for a long time.  Do not suddenly stop taking your medicine because you may develop a severe reaction. Your body becomes used to the medicine. This does NOT mean you are addicted. Addiction is a behavior related to getting and using a drug for a non-medical reason. If you have pain, you have a medical reason to take pain medicine. Your doctor will tell you how much medicine to take. If your doctor wants you to stop the medicine, the dose will be slowly lowered over time to avoid any side effects.  There are different types of narcotic medicines (opiates). If you take more than one type at the same time or if you are taking another medicine that also causes drowsiness, you may have more side effects. Give your health care provider a list of all medicines you use. Your doctor will tell you how much medicine to take. Do not take more medicine than directed. Call emergency for help if you have problems breathing or unusual sleepiness.  Do not take other medicines that contain acetaminophen with this medicine. Always read labels carefully. If you have questions, ask your doctor or pharmacist.  If you take too much acetaminophen get medical help right away.  Too much acetaminophen can be very dangerous and cause liver damage. Even if you do not have symptoms, it is important to get help right away.  You may get drowsy or dizzy. Do not drive, use machinery, or do anything that needs mental alertness until you know how this medicine affects you. Do not stand or sit up quickly, especially if you are an older patient. This reduces the risk of dizzy or fainting spells. Alcohol may interfere with the effect of this medicine. Avoid alcoholic drinks.  The medicine will cause constipation. Try to have a bowel movement at least every 2 to 3 days. If you do not have a bowel movement for 3 days, call your doctor or health care professional.  Your mouth may get dry. Chewing sugarless gum or sucking hard candy, and drinking plenty of water may help. Contact your doctor if the problem does not go away or is severe.  NOTE:This sheet is a summary. It may not cover all possible information. If you have questions about this medicine, talk to your doctor, pharmacist, or health care provider. Copyright© 2017 [employer]          ANESTHESIA  -For the first 24 hours after surgery:  Do not drive, use heavy equipment, make important decisions, or drink alcohol  -It is normal to feel sleepy for several hours.  Rest until you are more awake.  -Have someone stay with you, if needed.  They can watch for problems and help keep you safe.  -Some possible post anesthesia side effects include: nausea and vomiting, sore throat and hoarseness, sleepiness, and dizziness.    PAIN  -If you have pain after surgery, pain medicine will help you feel better.  Take it as directed, before pain becomes severe.  Most pain relievers taken by mouth need at least 20-30 minutes to start working.  -Do not drive or drink alcohol while taking pain medicine.  -Pain medication can upset your stomach.  Taking them with a little food may help.  -Other ways to help control pain: elevation, ice, and relaxation  -Call your  surgeon if still having unmanageable pain an hour after taking pain medicine.  -Pain medicine can cause constipation.  Taking an over-the counter stool softener while on prescription pain medicine and drinking plenty of fluids can prevent this side effect.  -Call your surgeon if you have severe side effects like: breathing problems, trouble waking up, dizziness, confusion, or severe constipation.    NAUSEA  -Some people have nausea after surgery.  This is often because of anesthesia, pain, pain medicine, or the stress of surgery.  -Do not push yourself to eat.  Start off with clear liquids and soup.  Slowly move to solid foods.  Don't eat fatty, rich, spicy foods at first.  Eat smaller amounts.  -If you develop persistent nausea and vomiting please notify your surgeon immediately.    BLEEDING  -Different types of surgery require different types of care and dressing changes.  It is important to follow all instructions and advice from your surgeon.  Change dressing as directed.  Call your surgeon for any concerns regarding postop bleeding.    SIGNS OF INFECTION  -Signs of infection include: fever, swelling, drainage, and redness  -Notify your surgeon if you have a fever of 100.4 F (38.0 C) or higher.  -Notify your surgeon if you notice redness, swelling, increased pain, pus, or a foul smell at the incision site.

## 2020-05-12 ENCOUNTER — PATIENT MESSAGE (OUTPATIENT)
Dept: ADMINISTRATIVE | Facility: HOSPITAL | Age: 30
End: 2020-05-12

## 2020-05-21 DIAGNOSIS — Z01.84 ANTIBODY RESPONSE EXAMINATION: ICD-10-CM

## 2020-05-24 ENCOUNTER — NURSE TRIAGE (OUTPATIENT)
Dept: ADMINISTRATIVE | Facility: CLINIC | Age: 30
End: 2020-05-24

## 2020-05-24 NOTE — TELEPHONE ENCOUNTER
Contacted pt on behalf of Post Procedural Symptom Tracker. Pt denies any fever, cough, or difficulty breathing since procedure. Advised pt if these symptoms do arise to contact [facility] or PCP. No follow up needed.    Reason for Disposition   Health Information question, no triage required and triager able to answer question    Additional Information   Negative: [1] Caller is not with the adult (patient) AND [2] reporting urgent symptoms   Negative: Lab result questions   Negative: Medication questions   Negative: Caller can't be reached by phone   Negative: Caller has already spoken to PCP or another triager   Negative: RN needs further essential information from caller in order to complete triage   Negative: Requesting regular office appointment   Negative: [1] Caller requesting NON-URGENT health information AND [2] PCP's office is the best resource    Protocols used: INFORMATION ONLY CALL-A-

## 2020-06-20 DIAGNOSIS — Z01.84 ANTIBODY RESPONSE EXAMINATION: ICD-10-CM

## 2020-06-23 ENCOUNTER — HOSPITAL ENCOUNTER (OUTPATIENT)
Dept: RADIOLOGY | Facility: HOSPITAL | Age: 30
Discharge: HOME | End: 2020-06-23
Attending: ORTHOPAEDIC SURGERY
Payer: COMMERCIAL

## 2020-06-23 DIAGNOSIS — M25.562 LEFT KNEE PAIN: ICD-10-CM

## 2020-06-23 PROCEDURE — 73564 X-RAY EXAM KNEE 4 OR MORE: CPT | Mod: 26,LT,, | Performed by: RADIOLOGY

## 2020-06-23 PROCEDURE — 73564 XR KNEE COMP 4 OR MORE VIEWS LEFT: ICD-10-PCS | Mod: 26,LT,, | Performed by: RADIOLOGY

## 2020-06-23 PROCEDURE — 73564 X-RAY EXAM KNEE 4 OR MORE: CPT | Mod: TC,FY,LT

## 2020-07-20 DIAGNOSIS — Z01.84 ANTIBODY RESPONSE EXAMINATION: ICD-10-CM

## 2020-08-05 ENCOUNTER — HOSPITAL ENCOUNTER (OUTPATIENT)
Dept: RADIOLOGY | Facility: HOSPITAL | Age: 30
Discharge: HOME | End: 2020-08-05
Attending: ORTHOPAEDIC SURGERY
Payer: COMMERCIAL

## 2020-08-05 DIAGNOSIS — M25.562 LEFT KNEE PAIN: Primary | ICD-10-CM

## 2020-08-05 DIAGNOSIS — M25.562 LEFT KNEE PAIN: ICD-10-CM

## 2020-08-05 PROCEDURE — 73564 X-RAY EXAM KNEE 4 OR MORE: CPT | Mod: 26,LT,, | Performed by: RADIOLOGY

## 2020-08-05 PROCEDURE — 73564 X-RAY EXAM KNEE 4 OR MORE: CPT | Mod: TC,FY,LT

## 2020-08-05 PROCEDURE — 73564 XR KNEE COMP 4 OR MORE VIEWS LEFT: ICD-10-PCS | Mod: 26,LT,, | Performed by: RADIOLOGY

## 2020-08-18 ENCOUNTER — OFFICE VISIT (OUTPATIENT)
Dept: INTERNAL MEDICINE | Facility: CLINIC | Age: 30
End: 2020-08-18
Payer: COMMERCIAL

## 2020-08-18 ENCOUNTER — LAB VISIT (OUTPATIENT)
Dept: LAB | Facility: HOSPITAL | Age: 30
End: 2020-08-18
Attending: INTERNAL MEDICINE
Payer: COMMERCIAL

## 2020-08-18 VITALS
DIASTOLIC BLOOD PRESSURE: 92 MMHG | HEIGHT: 64 IN | BODY MASS INDEX: 30.41 KG/M2 | WEIGHT: 178.13 LBS | TEMPERATURE: 99 F | OXYGEN SATURATION: 96 % | HEART RATE: 81 BPM | SYSTOLIC BLOOD PRESSURE: 130 MMHG

## 2020-08-18 DIAGNOSIS — Z00.00 ANNUAL PHYSICAL EXAM: ICD-10-CM

## 2020-08-18 DIAGNOSIS — D06.7 CARCINOMA IN SITU OF OTHER PART OF CERVIX: ICD-10-CM

## 2020-08-18 DIAGNOSIS — H61.20 IMPACTED CERUMEN, UNSPECIFIED LATERALITY: ICD-10-CM

## 2020-08-18 DIAGNOSIS — H93.12 TINNITUS OF LEFT EAR: ICD-10-CM

## 2020-08-18 DIAGNOSIS — Z23 NEED FOR TETANUS BOOSTER: ICD-10-CM

## 2020-08-18 DIAGNOSIS — I10 ESSENTIAL HYPERTENSION: ICD-10-CM

## 2020-08-18 LAB
BASOPHILS # BLD AUTO: 0.04 K/UL (ref 0–0.2)
BASOPHILS NFR BLD: 0.5 % (ref 0–1.9)
DIFFERENTIAL METHOD: ABNORMAL
EOSINOPHIL # BLD AUTO: 0.1 K/UL (ref 0–0.5)
EOSINOPHIL NFR BLD: 1.1 % (ref 0–8)
ERYTHROCYTE [DISTWIDTH] IN BLOOD BY AUTOMATED COUNT: 14.2 % (ref 11.5–14.5)
HCT VFR BLD AUTO: 42.3 % (ref 37–48.5)
HGB BLD-MCNC: 12.5 G/DL (ref 12–16)
IMM GRANULOCYTES # BLD AUTO: 0.02 K/UL (ref 0–0.04)
IMM GRANULOCYTES NFR BLD AUTO: 0.2 % (ref 0–0.5)
LYMPHOCYTES # BLD AUTO: 2.7 K/UL (ref 1–4.8)
LYMPHOCYTES NFR BLD: 33.3 % (ref 18–48)
MCH RBC QN AUTO: 27.6 PG (ref 27–31)
MCHC RBC AUTO-ENTMCNC: 29.6 G/DL (ref 32–36)
MCV RBC AUTO: 93 FL (ref 82–98)
MONOCYTES # BLD AUTO: 0.5 K/UL (ref 0.3–1)
MONOCYTES NFR BLD: 6.2 % (ref 4–15)
NEUTROPHILS # BLD AUTO: 4.8 K/UL (ref 1.8–7.7)
NEUTROPHILS NFR BLD: 58.7 % (ref 38–73)
NRBC BLD-RTO: 0 /100 WBC
PLATELET # BLD AUTO: 293 K/UL (ref 150–350)
PMV BLD AUTO: 11.3 FL (ref 9.2–12.9)
RBC # BLD AUTO: 4.53 M/UL (ref 4–5.4)
WBC # BLD AUTO: 8.17 K/UL (ref 3.9–12.7)

## 2020-08-18 PROCEDURE — 90471 IMMUNIZATION ADMIN: CPT | Mod: S$GLB,,, | Performed by: INTERNAL MEDICINE

## 2020-08-18 PROCEDURE — 3075F PR MOST RECENT SYSTOLIC BLOOD PRESS GE 130-139MM HG: ICD-10-PCS | Mod: CPTII,S$GLB,, | Performed by: INTERNAL MEDICINE

## 2020-08-18 PROCEDURE — 80061 LIPID PANEL: CPT

## 2020-08-18 PROCEDURE — 99395 PR PREVENTIVE VISIT,EST,18-39: ICD-10-PCS | Mod: 25,S$GLB,, | Performed by: INTERNAL MEDICINE

## 2020-08-18 PROCEDURE — 90714 TD VACCINE GREATER THAN OR EQUAL TO 7YO PRESERVATIVE FREE IM: ICD-10-PCS | Mod: S$GLB,,, | Performed by: INTERNAL MEDICINE

## 2020-08-18 PROCEDURE — 3080F DIAST BP >= 90 MM HG: CPT | Mod: CPTII,S$GLB,, | Performed by: INTERNAL MEDICINE

## 2020-08-18 PROCEDURE — 90471 TD VACCINE GREATER THAN OR EQUAL TO 7YO PRESERVATIVE FREE IM: ICD-10-PCS | Mod: S$GLB,,, | Performed by: INTERNAL MEDICINE

## 2020-08-18 PROCEDURE — 80053 COMPREHEN METABOLIC PANEL: CPT

## 2020-08-18 PROCEDURE — 69209 EAR CERUMEN REMOVAL: ICD-10-PCS | Mod: LT,S$GLB,, | Performed by: INTERNAL MEDICINE

## 2020-08-18 PROCEDURE — 36415 COLL VENOUS BLD VENIPUNCTURE: CPT | Mod: PO

## 2020-08-18 PROCEDURE — 83036 HEMOGLOBIN GLYCOSYLATED A1C: CPT

## 2020-08-18 PROCEDURE — 86803 HEPATITIS C AB TEST: CPT

## 2020-08-18 PROCEDURE — 69209 REMOVE IMPACTED EAR WAX UNI: CPT | Mod: LT,S$GLB,, | Performed by: INTERNAL MEDICINE

## 2020-08-18 PROCEDURE — 90714 TD VACC NO PRESV 7 YRS+ IM: CPT | Mod: S$GLB,,, | Performed by: INTERNAL MEDICINE

## 2020-08-18 PROCEDURE — 84443 ASSAY THYROID STIM HORMONE: CPT

## 2020-08-18 PROCEDURE — 85025 COMPLETE CBC W/AUTO DIFF WBC: CPT

## 2020-08-18 PROCEDURE — 99999 PR PBB SHADOW E&M-EST. PATIENT-LVL IV: ICD-10-PCS | Mod: PBBFAC,,, | Performed by: INTERNAL MEDICINE

## 2020-08-18 PROCEDURE — 3080F PR MOST RECENT DIASTOLIC BLOOD PRESSURE >= 90 MM HG: ICD-10-PCS | Mod: CPTII,S$GLB,, | Performed by: INTERNAL MEDICINE

## 2020-08-18 PROCEDURE — 99999 PR PBB SHADOW E&M-EST. PATIENT-LVL IV: CPT | Mod: PBBFAC,,, | Performed by: INTERNAL MEDICINE

## 2020-08-18 PROCEDURE — 99395 PREV VISIT EST AGE 18-39: CPT | Mod: 25,S$GLB,, | Performed by: INTERNAL MEDICINE

## 2020-08-18 PROCEDURE — 3075F SYST BP GE 130 - 139MM HG: CPT | Mod: CPTII,S$GLB,, | Performed by: INTERNAL MEDICINE

## 2020-08-18 PROCEDURE — 86703 HIV-1/HIV-2 1 RESULT ANTBDY: CPT

## 2020-08-18 RX ORDER — KETOCONAZOLE 20 MG/ML
SHAMPOO, SUSPENSION TOPICAL
COMMUNITY

## 2020-08-18 RX ORDER — HYDROCORTISONE 25 MG/ML
LOTION TOPICAL
COMMUNITY
Start: 2020-07-29

## 2020-08-18 RX ORDER — TACROLIMUS 1 MG/G
1 OINTMENT TOPICAL 2 TIMES DAILY
COMMUNITY
Start: 2020-07-29

## 2020-08-18 RX ORDER — FLUOCINOLONE ACETONIDE 0.11 MG/ML
OIL TOPICAL
COMMUNITY
Start: 2020-07-29

## 2020-08-18 NOTE — PROGRESS NOTES
Patient ID: Germaine Armando is a 30 y.o. female.    Chief Complaint: No chief complaint on file.    CARLENE Herron is a 30 y.o. female with AUGUST III and HTN who presents today for annual exam.  She reports a ringing in her ear.  It is located the left ear.  It is also associated with a throbbing sensation.  She denies any other associated symptoms such as runny nose, postnasal drip, sinus pressure/pain, discharge from the ear, or hearing loss.    In regards to hypertension, patient states she was on medication in the past.  Per chart review she has been tried on both amlodipine and hydrochlorothiazide.  She is unsure why the medication was stopped at some point in the past.  She reports that blood pressure is usually lower when she is not at the doctor's office.  But she does not routinely check her blood pressure at home.    In regards to her abnormal Pap smear, I discussed with her that her pathology report from over 1 year ago shows AUGUST grade 3. And I recommend that she follow up with OB-GYN.    Review of Systems   HENT: Positive for ear pain and tinnitus.    All other systems reviewed and are negative.      Objective:     Vitals:    08/18/20 1528   BP: (!) 130/92   Pulse: 81   Temp: 99 °F (37.2 °C)        Physical Exam  Vitals signs reviewed.   Constitutional:       General: She is not in acute distress.     Appearance: Normal appearance. She is well-developed. She is obese. She is not ill-appearing, toxic-appearing or diaphoretic.   HENT:      Head: Normocephalic and atraumatic.      Right Ear: External ear normal. There is impacted cerumen.      Left Ear: External ear normal. There is impacted cerumen.      Ears:      Comments: Ear wash performed on left ear. It was then re-examined. Canal was erythematous after the ear wash, but otherwise it was a normal examination of the left ear.     Nose: Nose normal.   Eyes:      General: No scleral icterus.        Right eye: No discharge.         Left eye: No  discharge.      Extraocular Movements: Extraocular movements intact.      Conjunctiva/sclera: Conjunctivae normal.   Neck:      Musculoskeletal: Neck supple.      Thyroid: No thyromegaly.      Trachea: No tracheal deviation.   Cardiovascular:      Rate and Rhythm: Normal rate and regular rhythm.      Heart sounds: Normal heart sounds. No murmur. No friction rub. No gallop.    Pulmonary:      Effort: Pulmonary effort is normal. No respiratory distress.      Breath sounds: Normal breath sounds. No stridor. No wheezing, rhonchi or rales.   Chest:      Chest wall: No tenderness.   Abdominal:      General: Bowel sounds are normal. There is no distension.      Palpations: Abdomen is soft. There is no mass.      Tenderness: There is no abdominal tenderness. There is no guarding or rebound.      Hernia: No hernia is present.   Musculoskeletal:         General: No tenderness or deformity.   Lymphadenopathy:      Cervical: No cervical adenopathy.   Skin:     General: Skin is warm and dry.      Findings: No erythema or rash.   Neurological:      General: No focal deficit present.      Mental Status: She is alert and oriented to person, place, and time. Mental status is at baseline.   Psychiatric:         Mood and Affect: Mood normal.         Behavior: Behavior normal.         Thought Content: Thought content normal.         Judgment: Judgment normal.         Assessment:       1. Annual physical exam    2. Need for tetanus booster    3. Impacted cerumen, unspecified laterality Active   4. 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 Tinnitus of left ear Active   6. Essential hypertension Active       Plan:         Annual physical exam  -     CBC auto differential; Future; Expected date: 08/18/2020  -     Comprehensive metabolic panel; Future; Expected date: 08/18/2020  -     Hepatitis C Antibody; Future; Expected date: 08/18/2020  -     Hemoglobin A1C; Future; Expected date: 08/18/2020  -     Lipid Panel; Future;  Expected date: 08/18/2020  -     TSH; Future; Expected date: 08/18/2020  -     HIV 1/2 Ag/Ab (4th Gen); Future; Expected date: 08/18/2020    Need for tetanus booster  -     (In Office Administered) Td Vaccine - Preservative Free    Impacted cerumen, unspecified laterality  -     Ear wax removal    Carcinoma in situ of other part of cervix  Comments:  REcommend patient follow up with OB-GYN.  Orders:  -     Ambulatory referral/consult to Obstetrics / Gynecology; Future; Expected date: 08/25/2020    Tinnitus of left ear  Comments:  Etiology unclear.  Ear examination was normal.  Follow-up with ENT.  Orders:  -     Ambulatory referral/consult to ENT; Future; Expected date: 08/25/2020    Essential hypertension  Comments:  Pt believes there is component of white coat syndrome. Monitor BP at home. F/u in 3 months with data from home  Orders:  -     Hypertension Digital Medicine (HDMP) Enrollment Order        RTC 3 months     Warning signs discussed, patient to call with any further issues or worsening of symptoms.       Parts of the above note were dictated using a voice dictation software. Please excuse any grammatical or typographical errors.

## 2020-08-19 DIAGNOSIS — Z01.84 ANTIBODY RESPONSE EXAMINATION: ICD-10-CM

## 2020-08-19 LAB
ALBUMIN SERPL BCP-MCNC: 4.1 G/DL (ref 3.5–5.2)
ALP SERPL-CCNC: 65 U/L (ref 55–135)
ALT SERPL W/O P-5'-P-CCNC: 16 U/L (ref 10–44)
ANION GAP SERPL CALC-SCNC: 10 MMOL/L (ref 8–16)
AST SERPL-CCNC: 18 U/L (ref 10–40)
BILIRUB SERPL-MCNC: 0.3 MG/DL (ref 0.1–1)
BUN SERPL-MCNC: 16 MG/DL (ref 6–20)
CALCIUM SERPL-MCNC: 9.2 MG/DL (ref 8.7–10.5)
CHLORIDE SERPL-SCNC: 108 MMOL/L (ref 95–110)
CHOLEST SERPL-MCNC: 168 MG/DL (ref 120–199)
CHOLEST/HDLC SERPL: 4.5 {RATIO} (ref 2–5)
CO2 SERPL-SCNC: 23 MMOL/L (ref 23–29)
CREAT SERPL-MCNC: 1.1 MG/DL (ref 0.5–1.4)
EST. GFR  (AFRICAN AMERICAN): >60 ML/MIN/1.73 M^2
EST. GFR  (NON AFRICAN AMERICAN): >60 ML/MIN/1.73 M^2
ESTIMATED AVG GLUCOSE: 111 MG/DL (ref 68–131)
GLUCOSE SERPL-MCNC: 80 MG/DL (ref 70–110)
HBA1C MFR BLD HPLC: 5.5 % (ref 4–5.6)
HCV AB SERPL QL IA: NEGATIVE
HDLC SERPL-MCNC: 37 MG/DL (ref 40–75)
HDLC SERPL: 22 % (ref 20–50)
HIV 1+2 AB+HIV1 P24 AG SERPL QL IA: NEGATIVE
LDLC SERPL CALC-MCNC: 105.2 MG/DL (ref 63–159)
NONHDLC SERPL-MCNC: 131 MG/DL
POTASSIUM SERPL-SCNC: 3.6 MMOL/L (ref 3.5–5.1)
PROT SERPL-MCNC: 7.9 G/DL (ref 6–8.4)
SODIUM SERPL-SCNC: 141 MMOL/L (ref 136–145)
TRIGL SERPL-MCNC: 129 MG/DL (ref 30–150)
TSH SERPL DL<=0.005 MIU/L-ACNC: 0.96 UIU/ML (ref 0.4–4)

## 2020-08-19 NOTE — PROCEDURES
Ear Cerumen Removal    Date/Time: 8/18/2020 3:20 PM  Performed by: Maurice Richardson MA  Authorized by: Marie Grider MD     Consent Done?:  Yes (Verbal)    Local anesthetic:  None  Medication Used:  Other  Location details:  Left ear  Procedure type: irrigation    Cerumen  Removal Results:  Cerumen completely removed  Patient tolerance:  Patient tolerated the procedure well with no immediate complications

## 2020-09-17 ENCOUNTER — PATIENT OUTREACH (OUTPATIENT)
Dept: ADMINISTRATIVE | Facility: OTHER | Age: 30
End: 2020-09-17

## 2020-09-18 DIAGNOSIS — Z01.84 ANTIBODY RESPONSE EXAMINATION: ICD-10-CM

## 2020-09-29 ENCOUNTER — PATIENT MESSAGE (OUTPATIENT)
Dept: OTHER | Facility: OTHER | Age: 30
End: 2020-09-29

## 2020-10-05 ENCOUNTER — PATIENT MESSAGE (OUTPATIENT)
Dept: ADMINISTRATIVE | Facility: HOSPITAL | Age: 30
End: 2020-10-05

## 2020-10-06 ENCOUNTER — HOSPITAL ENCOUNTER (OUTPATIENT)
Dept: RADIOLOGY | Facility: HOSPITAL | Age: 30
Discharge: HOME | End: 2020-10-06
Attending: ORTHOPAEDIC SURGERY
Payer: COMMERCIAL

## 2020-10-06 DIAGNOSIS — M79.89 SWELLING OF LEFT EXTREMITY: ICD-10-CM

## 2020-10-06 DIAGNOSIS — M79.89 SWELLING OF LEFT EXTREMITY: Primary | ICD-10-CM

## 2020-10-06 PROCEDURE — 93971 EXTREMITY STUDY: CPT | Mod: TC,LT

## 2020-10-06 PROCEDURE — 93971 US LOWER EXTREMITY VEINS LEFT: ICD-10-PCS | Mod: 26,LT,, | Performed by: RADIOLOGY

## 2020-10-06 PROCEDURE — 93971 EXTREMITY STUDY: CPT | Mod: 26,LT,, | Performed by: RADIOLOGY

## 2020-10-18 DIAGNOSIS — Z01.84 ANTIBODY RESPONSE EXAMINATION: ICD-10-CM

## 2020-11-17 DIAGNOSIS — Z01.84 ANTIBODY RESPONSE EXAMINATION: ICD-10-CM

## 2020-11-18 ENCOUNTER — TELEPHONE (OUTPATIENT)
Dept: INTERNAL MEDICINE | Facility: CLINIC | Age: 30
End: 2020-11-18

## 2020-11-27 ENCOUNTER — TELEPHONE (OUTPATIENT)
Dept: ORTHOPEDICS | Facility: CLINIC | Age: 30
End: 2020-11-27

## 2020-11-27 NOTE — TELEPHONE ENCOUNTER
Attempted to speak with patient in regards to scheduling follow up visit with Dr Granado.  Dr Granado would like to see this patient the end of December.

## 2020-11-27 NOTE — TELEPHONE ENCOUNTER
Attempted to speak with patient in regards to follow up visit with Dr Granado.  Left message informing patient of date and time for visit and informed patient to call if reschedule is needed.

## 2020-12-11 ENCOUNTER — PATIENT MESSAGE (OUTPATIENT)
Dept: OTHER | Facility: OTHER | Age: 30
End: 2020-12-11

## 2020-12-21 PROBLEM — S82.112D: Status: ACTIVE | Noted: 2020-12-21

## 2021-01-05 ENCOUNTER — PATIENT MESSAGE (OUTPATIENT)
Dept: ADMINISTRATIVE | Facility: HOSPITAL | Age: 31
End: 2021-01-05

## 2021-01-06 ENCOUNTER — OFFICE VISIT (OUTPATIENT)
Dept: ORTHOPEDICS | Facility: CLINIC | Age: 31
End: 2021-01-06
Payer: COMMERCIAL

## 2021-01-06 VITALS
HEART RATE: 80 BPM | HEIGHT: 64 IN | WEIGHT: 186.19 LBS | BODY MASS INDEX: 31.79 KG/M2 | SYSTOLIC BLOOD PRESSURE: 153 MMHG | DIASTOLIC BLOOD PRESSURE: 92 MMHG

## 2021-01-06 DIAGNOSIS — S82.112D DISPLACED FRACTURE OF LEFT TIBIAL SPINE, SUBSEQUENT ENCOUNTER FOR CLOSED FRACTURE WITH ROUTINE HEALING: Primary | ICD-10-CM

## 2021-01-06 PROCEDURE — 3077F PR MOST RECENT SYSTOLIC BLOOD PRESSURE >= 140 MM HG: ICD-10-PCS | Mod: CPTII,S$GLB,, | Performed by: ORTHOPAEDIC SURGERY

## 2021-01-06 PROCEDURE — 3080F PR MOST RECENT DIASTOLIC BLOOD PRESSURE >= 90 MM HG: ICD-10-PCS | Mod: CPTII,S$GLB,, | Performed by: ORTHOPAEDIC SURGERY

## 2021-01-06 PROCEDURE — 3008F BODY MASS INDEX DOCD: CPT | Mod: CPTII,S$GLB,, | Performed by: ORTHOPAEDIC SURGERY

## 2021-01-06 PROCEDURE — 1126F PR PAIN SEVERITY QUANTIFIED, NO PAIN PRESENT: ICD-10-PCS | Mod: S$GLB,,, | Performed by: ORTHOPAEDIC SURGERY

## 2021-01-06 PROCEDURE — 3008F PR BODY MASS INDEX (BMI) DOCUMENTED: ICD-10-PCS | Mod: CPTII,S$GLB,, | Performed by: ORTHOPAEDIC SURGERY

## 2021-01-06 PROCEDURE — 1126F AMNT PAIN NOTED NONE PRSNT: CPT | Mod: S$GLB,,, | Performed by: ORTHOPAEDIC SURGERY

## 2021-01-06 PROCEDURE — 99213 OFFICE O/P EST LOW 20 MIN: CPT | Mod: S$GLB,,, | Performed by: ORTHOPAEDIC SURGERY

## 2021-01-06 PROCEDURE — 3080F DIAST BP >= 90 MM HG: CPT | Mod: CPTII,S$GLB,, | Performed by: ORTHOPAEDIC SURGERY

## 2021-01-06 PROCEDURE — 99999 PR PBB SHADOW E&M-EST. PATIENT-LVL III: ICD-10-PCS | Mod: PBBFAC,,, | Performed by: ORTHOPAEDIC SURGERY

## 2021-01-06 PROCEDURE — 99999 PR PBB SHADOW E&M-EST. PATIENT-LVL III: CPT | Mod: PBBFAC,,, | Performed by: ORTHOPAEDIC SURGERY

## 2021-01-06 PROCEDURE — 3077F SYST BP >= 140 MM HG: CPT | Mod: CPTII,S$GLB,, | Performed by: ORTHOPAEDIC SURGERY

## 2021-01-06 PROCEDURE — 99213 PR OFFICE/OUTPT VISIT, EST, LEVL III, 20-29 MIN: ICD-10-PCS | Mod: S$GLB,,, | Performed by: ORTHOPAEDIC SURGERY

## 2021-04-05 ENCOUNTER — PATIENT MESSAGE (OUTPATIENT)
Dept: ADMINISTRATIVE | Facility: HOSPITAL | Age: 31
End: 2021-04-05

## 2021-05-10 ENCOUNTER — PATIENT MESSAGE (OUTPATIENT)
Dept: RESEARCH | Facility: HOSPITAL | Age: 31
End: 2021-05-10

## 2021-07-06 ENCOUNTER — PATIENT MESSAGE (OUTPATIENT)
Dept: ADMINISTRATIVE | Facility: HOSPITAL | Age: 31
End: 2021-07-06

## 2021-07-26 ENCOUNTER — PATIENT MESSAGE (OUTPATIENT)
Dept: ADMINISTRATIVE | Facility: HOSPITAL | Age: 31
End: 2021-07-26

## 2021-07-27 ENCOUNTER — TELEPHONE (OUTPATIENT)
Dept: FAMILY MEDICINE | Facility: CLINIC | Age: 31
End: 2021-07-27

## 2021-07-27 ENCOUNTER — LAB VISIT (OUTPATIENT)
Dept: LAB | Facility: HOSPITAL | Age: 31
End: 2021-07-27
Attending: FAMILY MEDICINE
Payer: COMMERCIAL

## 2021-07-27 ENCOUNTER — OFFICE VISIT (OUTPATIENT)
Dept: FAMILY MEDICINE | Facility: CLINIC | Age: 31
End: 2021-07-27
Payer: COMMERCIAL

## 2021-07-27 VITALS
SYSTOLIC BLOOD PRESSURE: 158 MMHG | HEIGHT: 64 IN | BODY MASS INDEX: 32.97 KG/M2 | OXYGEN SATURATION: 98 % | WEIGHT: 193.13 LBS | DIASTOLIC BLOOD PRESSURE: 102 MMHG | HEART RATE: 69 BPM

## 2021-07-27 DIAGNOSIS — N93.9 VAGINAL SPOTTING: ICD-10-CM

## 2021-07-27 DIAGNOSIS — I10 ESSENTIAL HYPERTENSION: ICD-10-CM

## 2021-07-27 DIAGNOSIS — Z12.4 PAP SMEAR FOR CERVICAL CANCER SCREENING: ICD-10-CM

## 2021-07-27 DIAGNOSIS — R82.90 URINE ABNORMALITY: ICD-10-CM

## 2021-07-27 DIAGNOSIS — I10 ESSENTIAL HYPERTENSION: Primary | ICD-10-CM

## 2021-07-27 DIAGNOSIS — R10.2 PELVIC PAIN IN FEMALE: ICD-10-CM

## 2021-07-27 DIAGNOSIS — N76.0 ACUTE VAGINITIS: ICD-10-CM

## 2021-07-27 LAB
BASOPHILS # BLD AUTO: 0.08 K/UL (ref 0–0.2)
BASOPHILS NFR BLD: 0.6 % (ref 0–1.9)
DIFFERENTIAL METHOD: ABNORMAL
EOSINOPHIL # BLD AUTO: 0 K/UL (ref 0–0.5)
EOSINOPHIL NFR BLD: 0.2 % (ref 0–8)
ERYTHROCYTE [DISTWIDTH] IN BLOOD BY AUTOMATED COUNT: 15 % (ref 11.5–14.5)
HCT VFR BLD AUTO: 37.6 % (ref 37–48.5)
HGB BLD-MCNC: 11.4 G/DL (ref 12–16)
IMM GRANULOCYTES # BLD AUTO: 0.05 K/UL (ref 0–0.04)
IMM GRANULOCYTES NFR BLD AUTO: 0.4 % (ref 0–0.5)
LYMPHOCYTES # BLD AUTO: 3.3 K/UL (ref 1–4.8)
LYMPHOCYTES NFR BLD: 26.6 % (ref 18–48)
MCH RBC QN AUTO: 27.7 PG (ref 27–31)
MCHC RBC AUTO-ENTMCNC: 30.3 G/DL (ref 32–36)
MCV RBC AUTO: 91 FL (ref 82–98)
MONOCYTES # BLD AUTO: 0.7 K/UL (ref 0.3–1)
MONOCYTES NFR BLD: 5.9 % (ref 4–15)
NEUTROPHILS # BLD AUTO: 8.3 K/UL (ref 1.8–7.7)
NEUTROPHILS NFR BLD: 66.3 % (ref 38–73)
NRBC BLD-RTO: 0 /100 WBC
PLATELET # BLD AUTO: 315 K/UL (ref 150–450)
PMV BLD AUTO: 10.8 FL (ref 9.2–12.9)
RBC # BLD AUTO: 4.12 M/UL (ref 4–5.4)
WBC # BLD AUTO: 12.49 K/UL (ref 3.9–12.7)

## 2021-07-27 PROCEDURE — 36415 COLL VENOUS BLD VENIPUNCTURE: CPT | Mod: PO | Performed by: FAMILY MEDICINE

## 2021-07-27 PROCEDURE — 99215 PR OFFICE/OUTPT VISIT, EST, LEVL V, 40-54 MIN: ICD-10-PCS | Mod: S$GLB,,, | Performed by: FAMILY MEDICINE

## 2021-07-27 PROCEDURE — 85025 COMPLETE CBC W/AUTO DIFF WBC: CPT | Performed by: FAMILY MEDICINE

## 2021-07-27 PROCEDURE — 99999 PR PBB SHADOW E&M-EST. PATIENT-LVL III: CPT | Mod: PBBFAC,,, | Performed by: FAMILY MEDICINE

## 2021-07-27 PROCEDURE — 80061 LIPID PANEL: CPT | Performed by: FAMILY MEDICINE

## 2021-07-27 PROCEDURE — 3080F PR MOST RECENT DIASTOLIC BLOOD PRESSURE >= 90 MM HG: ICD-10-PCS | Mod: CPTII,S$GLB,, | Performed by: FAMILY MEDICINE

## 2021-07-27 PROCEDURE — 80053 COMPREHEN METABOLIC PANEL: CPT | Performed by: FAMILY MEDICINE

## 2021-07-27 PROCEDURE — 3077F SYST BP >= 140 MM HG: CPT | Mod: CPTII,S$GLB,, | Performed by: FAMILY MEDICINE

## 2021-07-27 PROCEDURE — 84443 ASSAY THYROID STIM HORMONE: CPT | Performed by: FAMILY MEDICINE

## 2021-07-27 PROCEDURE — 1159F PR MEDICATION LIST DOCUMENTED IN MEDICAL RECORD: ICD-10-PCS | Mod: CPTII,S$GLB,, | Performed by: FAMILY MEDICINE

## 2021-07-27 PROCEDURE — 88142 CYTOPATH C/V THIN LAYER: CPT | Performed by: FAMILY MEDICINE

## 2021-07-27 PROCEDURE — 99215 OFFICE O/P EST HI 40 MIN: CPT | Mod: S$GLB,,, | Performed by: FAMILY MEDICINE

## 2021-07-27 PROCEDURE — 1160F PR REVIEW ALL MEDS BY PRESCRIBER/CLIN PHARMACIST DOCUMENTED: ICD-10-PCS | Mod: CPTII,S$GLB,, | Performed by: FAMILY MEDICINE

## 2021-07-27 PROCEDURE — 1160F RVW MEDS BY RX/DR IN RCRD: CPT | Mod: CPTII,S$GLB,, | Performed by: FAMILY MEDICINE

## 2021-07-27 PROCEDURE — 3077F PR MOST RECENT SYSTOLIC BLOOD PRESSURE >= 140 MM HG: ICD-10-PCS | Mod: CPTII,S$GLB,, | Performed by: FAMILY MEDICINE

## 2021-07-27 PROCEDURE — 3008F PR BODY MASS INDEX (BMI) DOCUMENTED: ICD-10-PCS | Mod: CPTII,S$GLB,, | Performed by: FAMILY MEDICINE

## 2021-07-27 PROCEDURE — 3080F DIAST BP >= 90 MM HG: CPT | Mod: CPTII,S$GLB,, | Performed by: FAMILY MEDICINE

## 2021-07-27 PROCEDURE — 3008F BODY MASS INDEX DOCD: CPT | Mod: CPTII,S$GLB,, | Performed by: FAMILY MEDICINE

## 2021-07-27 PROCEDURE — 1125F PR PAIN SEVERITY QUANTIFIED, PAIN PRESENT: ICD-10-PCS | Mod: CPTII,S$GLB,, | Performed by: FAMILY MEDICINE

## 2021-07-27 PROCEDURE — 99999 PR PBB SHADOW E&M-EST. PATIENT-LVL III: ICD-10-PCS | Mod: PBBFAC,,, | Performed by: FAMILY MEDICINE

## 2021-07-27 PROCEDURE — 1125F AMNT PAIN NOTED PAIN PRSNT: CPT | Mod: CPTII,S$GLB,, | Performed by: FAMILY MEDICINE

## 2021-07-27 PROCEDURE — 1159F MED LIST DOCD IN RCRD: CPT | Mod: CPTII,S$GLB,, | Performed by: FAMILY MEDICINE

## 2021-07-27 RX ORDER — MEDROXYPROGESTERONE ACETATE 104 MG/.65ML
INJECTION, SUSPENSION SUBCUTANEOUS
COMMUNITY
Start: 2021-07-02

## 2021-07-27 RX ORDER — POTASSIUM CHLORIDE 20 MEQ/1
20 TABLET, EXTENDED RELEASE ORAL DAILY
Qty: 90 TABLET | Refills: 3 | Status: SHIPPED | OUTPATIENT
Start: 2021-07-27

## 2021-07-27 RX ORDER — TRIAMCINOLONE ACETONIDE 1 MG/G
OINTMENT TOPICAL
COMMUNITY

## 2021-07-27 RX ORDER — CHLORTHALIDONE 25 MG/1
25 TABLET ORAL DAILY
Qty: 30 TABLET | Refills: 11 | Status: SHIPPED | OUTPATIENT
Start: 2021-07-27 | End: 2022-07-27

## 2021-07-27 RX ORDER — IBUPROFEN 800 MG/1
TABLET ORAL
COMMUNITY

## 2021-07-27 RX ORDER — FLUOCINOLONE ACETONIDE 0.1 MG/ML
SOLUTION TOPICAL
COMMUNITY

## 2021-07-27 RX ORDER — NAPROXEN 500 MG/1
TABLET ORAL
COMMUNITY

## 2021-07-27 RX ORDER — CLOBETASOL PROPIONATE 0.5 MG/G
OINTMENT TOPICAL
COMMUNITY
Start: 2021-03-17

## 2021-07-27 RX ORDER — TRIAMCINOLONE ACETONIDE 1 MG/G
CREAM TOPICAL
COMMUNITY

## 2021-07-27 RX ORDER — HYDROCORTISONE 25 MG/G
CREAM TOPICAL
COMMUNITY

## 2021-07-28 ENCOUNTER — PATIENT OUTREACH (OUTPATIENT)
Dept: ADMINISTRATIVE | Facility: HOSPITAL | Age: 31
End: 2021-07-28

## 2021-07-28 LAB
ALBUMIN SERPL BCP-MCNC: 3.7 G/DL (ref 3.5–5.2)
ALP SERPL-CCNC: 66 U/L (ref 55–135)
ALT SERPL W/O P-5'-P-CCNC: 20 U/L (ref 10–44)
ANION GAP SERPL CALC-SCNC: 5 MMOL/L (ref 8–16)
AST SERPL-CCNC: 15 U/L (ref 10–40)
BILIRUB SERPL-MCNC: 0.3 MG/DL (ref 0.1–1)
BUN SERPL-MCNC: 19 MG/DL (ref 6–20)
CALCIUM SERPL-MCNC: 8.9 MG/DL (ref 8.7–10.5)
CHLORIDE SERPL-SCNC: 109 MMOL/L (ref 95–110)
CHOLEST SERPL-MCNC: 151 MG/DL (ref 120–199)
CHOLEST/HDLC SERPL: 4.1 {RATIO} (ref 2–5)
CO2 SERPL-SCNC: 25 MMOL/L (ref 23–29)
CREAT SERPL-MCNC: 0.9 MG/DL (ref 0.5–1.4)
EST. GFR  (AFRICAN AMERICAN): >60 ML/MIN/1.73 M^2
EST. GFR  (NON AFRICAN AMERICAN): >60 ML/MIN/1.73 M^2
GLUCOSE SERPL-MCNC: 73 MG/DL (ref 70–110)
HDLC SERPL-MCNC: 37 MG/DL (ref 40–75)
HDLC SERPL: 24.5 % (ref 20–50)
LDLC SERPL CALC-MCNC: 99.4 MG/DL (ref 63–159)
NONHDLC SERPL-MCNC: 114 MG/DL
POTASSIUM SERPL-SCNC: 4 MMOL/L (ref 3.5–5.1)
PROT SERPL-MCNC: 7.2 G/DL (ref 6–8.4)
SODIUM SERPL-SCNC: 139 MMOL/L (ref 136–145)
TRIGL SERPL-MCNC: 73 MG/DL (ref 30–150)
TSH SERPL DL<=0.005 MIU/L-ACNC: 2.22 UIU/ML (ref 0.4–4)

## 2021-07-29 ENCOUNTER — TELEPHONE (OUTPATIENT)
Dept: FAMILY MEDICINE | Facility: CLINIC | Age: 31
End: 2021-07-29

## 2021-07-29 ENCOUNTER — PATIENT MESSAGE (OUTPATIENT)
Dept: FAMILY MEDICINE | Facility: CLINIC | Age: 31
End: 2021-07-29

## 2021-07-30 ENCOUNTER — TELEPHONE (OUTPATIENT)
Dept: INTERNAL MEDICINE | Facility: CLINIC | Age: 31
End: 2021-07-30

## 2021-08-04 LAB
FINAL PATHOLOGIC DIAGNOSIS: NORMAL
Lab: NORMAL

## 2021-08-16 ENCOUNTER — TELEPHONE (OUTPATIENT)
Dept: FAMILY MEDICINE | Facility: CLINIC | Age: 31
End: 2021-08-16

## 2021-08-26 ENCOUNTER — TELEPHONE (OUTPATIENT)
Dept: ADMINISTRATIVE | Facility: OTHER | Age: 31
End: 2021-08-26

## 2021-11-17 ENCOUNTER — TELEPHONE (OUTPATIENT)
Dept: FAMILY MEDICINE | Facility: CLINIC | Age: 31
End: 2021-11-17
Payer: COMMERCIAL

## 2022-01-12 ENCOUNTER — LAB VISIT (OUTPATIENT)
Dept: PRIMARY CARE CLINIC | Facility: OTHER | Age: 32
End: 2022-01-12
Attending: INTERNAL MEDICINE
Payer: MEDICAID

## 2022-01-12 DIAGNOSIS — U07.1 COVID-19: Primary | ICD-10-CM

## 2022-01-12 LAB
CTP QC/QA: YES
SARS-COV-2 AG RESP QL IA.RAPID: NEGATIVE

## 2022-01-12 PROCEDURE — 87811 SARS-COV-2 COVID19 W/OPTIC: CPT

## 2022-01-12 NOTE — PROGRESS NOTES
Nasal specimen collected.   BinaxNOW test performed in the presence of patient and results loaded into EPIC. Pt instructed with following instructions:.  Instructions for Patients with Confirmed or Suspected COVID-19    If you are awaiting your test result, you will either be called or it will be released to the patient portal.  If you have any questions about your test, please visit [email or web address] or call our COVID-19 information line at [phone].      Please isolate yourself at home.  You may leave home and/or return to work once the following conditions are met:    If you were not hospitalized and are not severely immunocompromised*:   More than 10 days since symptoms first appeared AND   More than 24 hours fever free without medications AND   Symptoms have improved     If you were hospitalized OR are severely immunocompromised*:   More than 20 days since symptoms first appeared   More than 24 hours fever free without medications   Symptoms have improved    If you had no symptoms but tested positive:   More than 10 days since the date of the first positive test (20 days if severely immunocompromised).   If you develop symptoms, then use the guidelines above.     *Definition of severely immunocompromised:  - Current chemotherapy for cancer  - Untreated HIV with CD4 count less than 200  - Combined primary immunodeficiency disorder  - Prednisone more than 20 mg per day for more than 14 days  - Post-transplant patients

## 2023-03-31 NOTE — TELEPHONE ENCOUNTER
Called pt to let her know arrival time for Sx pt didnt answer and phone didnt have a vm set up to leave a message I will try reaching out again around 11am.    None

## (undated) DEVICE — SUT ETHILON 3-0 BLK MONO FS

## (undated) DEVICE — PACK UNIVERSAL SPLIT II

## (undated) DEVICE — COVER OVERHEAD SURG LT BLUE

## (undated) DEVICE — PADDING CAST SPECIALIST 6X4YD

## (undated) DEVICE — SPONGE LAP 18X18 PREWASHED

## (undated) DEVICE — SUT VICRYL 2-0 CT-2 VCP269H

## (undated) DEVICE — PAD CAST SPECIALIST STRL 6

## (undated) DEVICE — TRAY FOLEY 16FR INFECTION CONT

## (undated) DEVICE — BRACE KNEE T SCOPE PREMIER

## (undated) DEVICE — NDL 18GA X1 1/2 REG BEVEL

## (undated) DEVICE — SUT 2/0 27IN PDS II VIO MO

## (undated) DEVICE — SHEET DRAPE MEDIUM

## (undated) DEVICE — DRESSING XEROFORM 1X8IN

## (undated) DEVICE — GOWN SURGICAL XX LARGE X LONG

## (undated) DEVICE — DRESSING XEROFORM FOIL PK 1X8

## (undated) DEVICE — SEE MEDLINE ITEM 157131

## (undated) DEVICE — DRAPE STERI U-SHAPED 47X51IN

## (undated) DEVICE — DRAPE EMERALD 87X114.75X113

## (undated) DEVICE — BLADE SURG CARBON STEEL #10

## (undated) DEVICE — PIN STERILE SAFETY LARGE

## (undated) DEVICE — PAD PREP 50/CA

## (undated) DEVICE — KIT ACL DISP W/O SAW BLADE

## (undated) DEVICE — SEE MEDLINE ITEM 157116

## (undated) DEVICE — PACK ARTHROSCOPY W/ISO BAC

## (undated) DEVICE — SUT SILK 3-0 BLK BR SH 30IN

## (undated) DEVICE — MAT SUCTION PUDDLEVAC ORANGE

## (undated) DEVICE — SEE MEDLINE ITEM 157128

## (undated) DEVICE — PAD ABDOMINAL 5X9 STERILE

## (undated) DEVICE — PACK BASIC

## (undated) DEVICE — SEE MEDLINE ITEM 157117

## (undated) DEVICE — BRA STYLE 7 CUP LARGE

## (undated) DEVICE — SKINMARKER & RULER REGULAR X-F

## (undated) DEVICE — BLADE SURG #15 CARBON STEEL

## (undated) DEVICE — ELECTRODE REM PLYHSV RETURN 9

## (undated) DEVICE — SYS CLSR DERMABOND PRINEO 22CM

## (undated) DEVICE — SUT 3-0 VICRYL SH CR/8 18

## (undated) DEVICE — BANDAGE KERLIX P/P 2.25IN STER

## (undated) DEVICE — SEE MEDLINE ITEM 152523

## (undated) DEVICE — GAUZE SPONGE 4X4 12PLY

## (undated) DEVICE — SEE MEDLINE ITEM 146417

## (undated) DEVICE — BLADE SURG CARBON STEEL SZ11

## (undated) DEVICE — Device

## (undated) DEVICE — SUT ETHILON 3/0 18IN PS-1

## (undated) DEVICE — SPONGE DERMACEA GAUZE 4X4

## (undated) DEVICE — DRAPE PLASTIC U 60X72

## (undated) DEVICE — BOVIE SUCTION

## (undated) DEVICE — SEE MEDLINE ITEM 152512

## (undated) DEVICE — INSTRUMENT SUCTION FRAZIER 12F

## (undated) DEVICE — SUT MCRYL PLUS 4-0 PS2 27IN

## (undated) DEVICE — SEE MEDLINE ITEM 146231

## (undated) DEVICE — DRAIN CHANNEL ROUND 15FR

## (undated) DEVICE — SEE MEDLINE ITEM 146292

## (undated) DEVICE — PASSER SUTURELASSO MICROSURG

## (undated) DEVICE — SEE MEDLINE ITEM 156955

## (undated) DEVICE — SUT MONOCRYL 3-0 PS-2 UND

## (undated) DEVICE — SEE MEDLINE ITEM 152622

## (undated) DEVICE — SUT 5/0 27IN CHROMIC GUT B

## (undated) DEVICE — GLOVE BIOGEL PI MICRO INDIC 8

## (undated) DEVICE — SEE MEDLINE ITEM 157216

## (undated) DEVICE — DRAPE STERI INSTRUMENT 1018

## (undated) DEVICE — NDL SPINAL 18GX3.5 SPINOCAN

## (undated) DEVICE — EVACUATOR WOUND BULB 100CC

## (undated) DEVICE — KIT SURGIFLO HEMOSTATIC MATRIX

## (undated) DEVICE — KIT POWDER ABSORBABLE GELATIN

## (undated) DEVICE — SUT SILK 2-0 PS 18IN BLACK

## (undated) DEVICE — STAPLER SKIN ROTATING HEAD

## (undated) DEVICE — SEE MEDLINE ITEM 152529

## (undated) DEVICE — SEE MEDLINE ITEM 146345

## (undated) DEVICE — NDL SPINAL 20GX3.5 HUB

## (undated) DEVICE — GOWN SURGICAL X-LARGE

## (undated) DEVICE — SYR 30CC LUER LOCK

## (undated) DEVICE — SUT ETHILON 2-0 BLK MONO PS

## (undated) DEVICE — SEE MEDLINE ITEM 152530

## (undated) DEVICE — TUBE SET INFLOW/OUTFLOW

## (undated) DEVICE — TRAY MINOR GEN SURG

## (undated) DEVICE — GLOVE BIOGEL ORTHOPEDIC 8

## (undated) DEVICE — KIT DYE SPY ELITE

## (undated) DEVICE — MANIFOLD 4 PORT

## (undated) DEVICE — ELECTRODE BLADE E-Z CLEAN 4IN

## (undated) DEVICE — APPLICATOR CHLORAPREP ORN 26ML

## (undated) DEVICE — SUT 5/0 18IN PLAIN FAST AB

## (undated) DEVICE — DRAPE C-ARM/MOBILE XRAY 44X80

## (undated) DEVICE — SEE MEDLINE ITEM 146313

## (undated) DEVICE — PAD ABD 8X10 STERILE

## (undated) DEVICE — SUT FIBERWIRE FIBER 2M